# Patient Record
Sex: FEMALE | Race: WHITE | NOT HISPANIC OR LATINO | Employment: OTHER | URBAN - METROPOLITAN AREA
[De-identification: names, ages, dates, MRNs, and addresses within clinical notes are randomized per-mention and may not be internally consistent; named-entity substitution may affect disease eponyms.]

---

## 2021-05-21 ENCOUNTER — CONSULT (OUTPATIENT)
Dept: ONCOLOGY | Facility: CLINIC | Age: 56
End: 2021-05-21

## 2021-05-21 ENCOUNTER — LAB (OUTPATIENT)
Dept: LAB | Facility: HOSPITAL | Age: 56
End: 2021-05-21

## 2021-05-21 VITALS
BODY MASS INDEX: 28.99 KG/M2 | DIASTOLIC BLOOD PRESSURE: 89 MMHG | TEMPERATURE: 98.6 F | SYSTOLIC BLOOD PRESSURE: 133 MMHG | HEIGHT: 66 IN | HEART RATE: 85 BPM | RESPIRATION RATE: 16 BRPM | OXYGEN SATURATION: 98 % | WEIGHT: 180.4 LBS

## 2021-05-21 DIAGNOSIS — D70.8 OTHER NEUTROPENIA (HCC): Primary | ICD-10-CM

## 2021-05-21 DIAGNOSIS — D70.8 OTHER NEUTROPENIA (HCC): ICD-10-CM

## 2021-05-21 PROBLEM — D70.9 NEUTROPENIA (HCC): Status: ACTIVE | Noted: 2021-05-21

## 2021-05-21 LAB
ALBUMIN SERPL-MCNC: 4 G/DL (ref 3.5–5.2)
ALBUMIN/GLOB SERPL: 1 G/DL
ALP SERPL-CCNC: 54 U/L (ref 39–117)
ALT SERPL W P-5'-P-CCNC: 20 U/L (ref 1–33)
ANION GAP SERPL CALCULATED.3IONS-SCNC: 7 MMOL/L (ref 5–15)
AST SERPL-CCNC: 29 U/L (ref 1–32)
BASOPHILS # BLD AUTO: 0.02 10*3/MM3 (ref 0–0.2)
BASOPHILS NFR BLD AUTO: 0.8 % (ref 0–1.5)
BILIRUB SERPL-MCNC: 0.3 MG/DL (ref 0–1.2)
BUN SERPL-MCNC: 14 MG/DL (ref 6–20)
BUN/CREAT SERPL: 16.3 (ref 7–25)
CALCIUM SPEC-SCNC: 8.8 MG/DL (ref 8.6–10.5)
CHLORIDE SERPL-SCNC: 108 MMOL/L (ref 98–107)
CO2 SERPL-SCNC: 25 MMOL/L (ref 22–29)
CREAT SERPL-MCNC: 0.86 MG/DL (ref 0.57–1)
DEPRECATED RDW RBC AUTO: 47.3 FL (ref 37–54)
EOSINOPHIL # BLD AUTO: 0.05 10*3/MM3 (ref 0–0.4)
EOSINOPHIL NFR BLD AUTO: 2.1 % (ref 0.3–6.2)
ERYTHROCYTE [DISTWIDTH] IN BLOOD BY AUTOMATED COUNT: 13.7 % (ref 12.3–15.4)
FERRITIN SERPL-MCNC: 74.39 NG/ML (ref 13–150)
GFR SERPL CREATININE-BSD FRML MDRD: 69 ML/MIN/1.73
GLOBULIN UR ELPH-MCNC: 3.9 GM/DL
GLUCOSE SERPL-MCNC: 66 MG/DL (ref 65–99)
HCT VFR BLD AUTO: 36.4 % (ref 34–46.6)
HGB BLD-MCNC: 11.9 G/DL (ref 12–15.9)
HIV1+2 AB SER QL: NORMAL
IMM GRANULOCYTES # BLD AUTO: 0.01 10*3/MM3 (ref 0–0.05)
IMM GRANULOCYTES NFR BLD AUTO: 0.4 % (ref 0–0.5)
IRON 24H UR-MRATE: 79 MCG/DL (ref 37–145)
IRON SATN MFR SERPL: 26 % (ref 20–50)
LDH SERPL-CCNC: 179 U/L (ref 135–214)
LYMPHOCYTES # BLD AUTO: 0.47 10*3/MM3 (ref 0.7–3.1)
LYMPHOCYTES NFR BLD AUTO: 19.5 % (ref 19.6–45.3)
MCH RBC QN AUTO: 31 PG (ref 26.6–33)
MCHC RBC AUTO-ENTMCNC: 32.7 G/DL (ref 31.5–35.7)
MCV RBC AUTO: 94.8 FL (ref 79–97)
MONOCYTES # BLD AUTO: 0.37 10*3/MM3 (ref 0.1–0.9)
MONOCYTES NFR BLD AUTO: 15.4 % (ref 5–12)
NEUTROPHILS NFR BLD AUTO: 1.49 10*3/MM3 (ref 1.7–7)
NEUTROPHILS NFR BLD AUTO: 61.8 % (ref 42.7–76)
NRBC BLD AUTO-RTO: 0 /100 WBC (ref 0–0.2)
PLATELET # BLD AUTO: 240 10*3/MM3 (ref 140–450)
PMV BLD AUTO: 10.1 FL (ref 6–12)
POTASSIUM SERPL-SCNC: 4 MMOL/L (ref 3.5–5.2)
PROT SERPL-MCNC: 7.9 G/DL (ref 6–8.5)
RBC # BLD AUTO: 3.84 10*6/MM3 (ref 3.77–5.28)
SODIUM SERPL-SCNC: 140 MMOL/L (ref 136–145)
TIBC SERPL-MCNC: 304 MCG/DL (ref 298–536)
TRANSFERRIN SERPL-MCNC: 204 MG/DL (ref 200–360)
WBC # BLD AUTO: 2.41 10*3/MM3 (ref 3.4–10.8)

## 2021-05-21 PROCEDURE — 82728 ASSAY OF FERRITIN: CPT

## 2021-05-21 PROCEDURE — 84466 ASSAY OF TRANSFERRIN: CPT

## 2021-05-21 PROCEDURE — 82607 VITAMIN B-12: CPT

## 2021-05-21 PROCEDURE — 85025 COMPLETE CBC W/AUTO DIFF WBC: CPT

## 2021-05-21 PROCEDURE — G0432 EIA HIV-1/HIV-2 SCREEN: HCPCS

## 2021-05-21 PROCEDURE — 99204 OFFICE O/P NEW MOD 45 MIN: CPT | Performed by: INTERNAL MEDICINE

## 2021-05-21 PROCEDURE — 36415 COLL VENOUS BLD VENIPUNCTURE: CPT

## 2021-05-21 PROCEDURE — 80053 COMPREHEN METABOLIC PANEL: CPT

## 2021-05-21 PROCEDURE — 83540 ASSAY OF IRON: CPT

## 2021-05-21 PROCEDURE — 82746 ASSAY OF FOLIC ACID SERUM: CPT

## 2021-05-21 PROCEDURE — 82525 ASSAY OF COPPER: CPT

## 2021-05-21 PROCEDURE — 83615 LACTATE (LD) (LDH) ENZYME: CPT

## 2021-05-21 PROCEDURE — 85060 BLOOD SMEAR INTERPRETATION: CPT

## 2021-05-21 RX ORDER — PREGABALIN 100 MG/1
CAPSULE ORAL
COMMUNITY
Start: 2021-04-27 | End: 2022-09-13

## 2021-05-21 RX ORDER — NAPROXEN 500 MG/1
500 TABLET ORAL 2 TIMES DAILY WITH MEALS
COMMUNITY
Start: 2021-05-13 | End: 2022-12-05

## 2021-05-21 RX ORDER — LEVOTHYROXINE SODIUM 112 UG/1
112 TABLET ORAL DAILY
COMMUNITY
Start: 2021-05-13

## 2021-05-21 RX ORDER — PILOCARPINE HYDROCHLORIDE 5 MG/1
5 TABLET, FILM COATED ORAL 3 TIMES DAILY PRN
COMMUNITY

## 2021-05-21 RX ORDER — SERTRALINE HYDROCHLORIDE 100 MG/1
100 TABLET, FILM COATED ORAL DAILY
COMMUNITY
Start: 2021-05-13

## 2021-05-21 NOTE — PROGRESS NOTES
New Patient Office Visit      Date: 2021     Patient Name: Shell Nayak  MRN: [id number]  : 1965  Referring Physician: John Greer    Chief Complaint: Establish care for neutropenia    History of Present Illness: Shell Nayak is a pleasant 55 y.o. female past medical history of hypothyroidism, fibromyalgia, Sojourn syndrome, rheumatoid arthritis who presents today for evaluation of neutropenia. The patient is accompanied by their aunt who contributes to the history of their care.  The patient has been followed by rheumatology for her multiple arthritic conditions.  She has been on several medications including most recently rituximab.  Per chart review, she has had multiple CBCs checked over the past year which have been notable for a mild leukopenia of around 2.5-3.  ANC has been stable at 1500.  She denies any recurrent fevers or infections.  Denies any unexplained weight loss.  Does note night sweats that happen 2-3 times per month.  Also notes significant fatigue which has been worsening over the past several months.  Denies any easy bleeding or bruising episodes.  Denies any family history of leukemia or lymphoma.  She is anxious about her visit today but otherwise compliant with other home medications with no other major concerns.    Oncology History:    Oncology/Hematology History    No history exists.       Subjective      Review of Systems:     Constitutional: Positive for fatigue.  Negative for fevers, chills, or weight loss  Eyes: Negative for blurred vision or discharge         Ear/Nose/Throat: Negative for difficulty swallowing, sore throat, LAD                                                       Respiratory: Negative for cough, SOA, wheezing                                                                                        Cardiovascular: Negative for chest pain or palpitations                                                                  Gastrointestinal: Negative for  nausea, vomiting or diarrhea                                                                     Genitourinary: Negative for dysuria or hematuria                                                                                           Musculoskeletal: Positive for multiple joint pains.  Negative for muscle aches                                                                        Neurologic: Negative for any weakness, headaches, dizziness                                                                         Hematologic: Negative for any easy bleeding or bruising                                                                                   Psychiatric: Negative for anxiety or depression                             Past Medical History:   Past Medical History:   Diagnosis Date   • Arthritis    • COPD (chronic obstructive pulmonary disease) (CMS/HCC)    • Disease of thyroid gland    • Rheumatoid arthritis (CMS/HCC)    • Sjogren's disease (CMS/HCC)        Past Surgical History:   Past Surgical History:   Procedure Laterality Date   • APPENDECTOMY     •  SECTION     • CHOLECYSTECTOMY     • LUNG BIOPSY     • THYROID BIOPSY         Family History:   Family History   Problem Relation Age of Onset   • Colon cancer Father    • Prostate cancer Father    • Breast cancer Maternal Grandmother    • Thyroid cancer Maternal Grandmother        Social History:   Social History     Socioeconomic History   • Marital status:      Spouse name: Not on file   • Number of children: Not on file   • Years of education: Not on file   • Highest education level: Not on file   Tobacco Use   • Smoking status: Never Smoker   • Smokeless tobacco: Never Used   Substance and Sexual Activity   • Alcohol use: Yes     Comment: 0-1 drink weekly   • Drug use: Never       Medications:     Current Outpatient Medications:   •  levothyroxine (SYNTHROID, LEVOTHROID) 112 MCG tablet, , Disp: , Rfl:   •  naproxen (NAPROSYN) 500 MG tablet, ,  "Disp: , Rfl:   •  pilocarpine (SALAGEN) 5 MG tablet, Take 5 mg by mouth 3 (Three) Times a Day., Disp: , Rfl:   •  pregabalin (LYRICA) 100 MG capsule, , Disp: , Rfl:   •  sertraline (ZOLOFT) 100 MG tablet, , Disp: , Rfl:     Allergies:   Allergies   Allergen Reactions   • Penicillins Rash     Hasn't had since childhood       Objective     Physical Exam:  Vital Signs:   Vitals:    05/21/21 1325   BP: 133/89   Pulse: 85   Resp: 16   Temp: 98.6 °F (37 °C)   TempSrc: Temporal   SpO2: 98%   Weight: 81.8 kg (180 lb 6.4 oz)   Height: 167.6 cm (66\")   PainSc:   4   PainLoc: Foot  Comment: Feet, Back, Shoulders     Pain Score    05/21/21 1325   PainSc:   4   PainLoc: Foot  Comment: Feet, Back, Shoulders     ECOG Performance Status: 1 - Symptomatic but completely ambulatory    Constitutional: NAD, ECOG 1  Eyes: PERRLA, scleral anicteric  ENT: No LAD, no thyromegaly  Respiratory: CTAB, no wheezing, rales, rhonchi  Cardiovascular: RRR, no murmurs, pulses 2+ bilaterally  Abdomen: soft, NT/ND, no HSM  Musculoskeletal: strength 5/5 bilaterally, no c/c/e  Neurologic: A&O x 3, CN II-XII intact grossly  Psych: mood and affect congruent, no SI or HI    Results Review:   No visits with results within 2 Week(s) from this visit.   Latest known visit with results is:   No results found for any previous visit.       No results found.    Assessment / Plan      Assessment/Plan:   1. Other neutropenia (CMS/HCC) (Primary)  -Unclear etiology at this time.  Likely secondary to patient's underlying rheumatologic conditions as well as medication  -Per chart review WBC between 2.5-3 with ANC at 1500  -Patient currently asymptomatic with no significant constitutional symptoms  -We will check labs as below to rule out other secondary causes  -     CBC & Differential; Future  -     Comprehensive Metabolic Panel; Future  -     Ferritin; Future  -     Iron Profile; Future  -     Lactate Dehydrogenase; Future  -     Vitamin B12; Future  -     Folate; " Future  -     HIV-1 / O / 2 Ag / Antibody 4th Generation; Future  -     Peripheral Blood Smear; Future  -     Copper, Serum; Future  -     Flow Cytometry, Blood; Future    2.  Sojourn syndrome/rheumatoid arthritis/fibromyalgia  -Followed by Dr. Greer with rheumatology      Follow Up:   Follow-up in 3-4 weeks     Lyle Metcalf MD  Hematology and Oncology     Please note that portions of this note may have been completed with a voice recognition program. Efforts were made to edit the dictations, but occasionally words are mistranscribed.

## 2021-05-22 LAB
FOLATE SERPL-MCNC: 15.9 NG/ML (ref 4.78–24.2)
VIT B12 BLD-MCNC: 356 PG/ML (ref 211–946)

## 2021-05-24 LAB
CYTOLOGIST CVX/VAG CYTO: NORMAL
PATH INTERP BLD-IMP: NORMAL

## 2021-05-25 LAB — REF LAB TEST METHOD: NORMAL

## 2021-05-26 LAB — COPPER SERPL-MCNC: 90 UG/DL (ref 80–158)

## 2021-06-18 ENCOUNTER — OFFICE VISIT (OUTPATIENT)
Dept: ONCOLOGY | Facility: CLINIC | Age: 56
End: 2021-06-18

## 2021-06-18 VITALS
BODY MASS INDEX: 28.61 KG/M2 | HEIGHT: 66 IN | WEIGHT: 178 LBS | TEMPERATURE: 98.4 F | RESPIRATION RATE: 16 BRPM | OXYGEN SATURATION: 98 % | DIASTOLIC BLOOD PRESSURE: 91 MMHG | HEART RATE: 69 BPM | SYSTOLIC BLOOD PRESSURE: 152 MMHG

## 2021-06-18 DIAGNOSIS — D70.8 OTHER NEUTROPENIA (HCC): Primary | ICD-10-CM

## 2021-06-18 PROCEDURE — 99214 OFFICE O/P EST MOD 30 MIN: CPT | Performed by: INTERNAL MEDICINE

## 2021-06-18 RX ORDER — BUDESONIDE AND FORMOTEROL FUMARATE DIHYDRATE 160; 4.5 UG/1; UG/1
2 AEROSOL RESPIRATORY (INHALATION)
COMMUNITY
Start: 2021-05-27

## 2021-06-18 RX ORDER — ALBUTEROL SULFATE 90 UG/1
2 AEROSOL, METERED RESPIRATORY (INHALATION) EVERY 4 HOURS PRN
COMMUNITY
Start: 2021-06-10

## 2021-06-18 RX ORDER — ALBUTEROL SULFATE 2.5 MG/3ML
2.5 SOLUTION RESPIRATORY (INHALATION) EVERY 4 HOURS PRN
COMMUNITY
Start: 2021-06-10

## 2021-06-18 NOTE — PROGRESS NOTES
Follow Up Office Visit      Date: 2021     Patient Name: Shell Nayak  MRN: [id number]  : 1965  Referring Physician: John Greer     Chief Complaint:  Follow-up for neutropenia     History of Present Illness: Shell Nayak is a pleasant 55 y.o. female past medical history of hypothyroidism, fibromyalgia, Sojourn syndrome, rheumatoid arthritis who presents today for evaluation of neutropenia. The patient is accompanied by their aunt who contributes to the history of their care.  The patient has been followed by rheumatology for her multiple arthritic conditions.  She has been on several medications including most recently rituximab.  Per chart review, she has had multiple CBCs checked over the past year which have been notable for a mild leukopenia of around 2.5-3.  ANC has been stable at 1500.  She denies any recurrent fevers or infections.  Denies any unexplained weight loss.  Does note night sweats that happen 2-3 times per month.  Also notes significant fatigue which has been worsening over the past several months.  Denies any easy bleeding or bruising episodes.  Denies any family history of leukemia or lymphoma.  She is anxious about her visit today but otherwise compliant with other home medications with no other major concerns.    Interval History:  Presents clinic for follow-up.  Continues to follow with rheumatology for rituximab infusions secondary to arthritis.  She continues to have some fatigue but denies any unexplained fevers, chills, weight loss, night sweats    Oncology History:    Oncology/Hematology History    No history exists.       Subjective      Review of Systems:   Constitutional: Negative for fevers, chills, or weight loss  Eyes: Negative for blurred vision or discharge         Ear/Nose/Throat: Negative for difficulty swallowing, sore throat, LAD                                                       Respiratory: Negative for cough, SOA, wheezing                                                                                         Cardiovascular: Negative for chest pain or palpitations                                                                  Gastrointestinal: Negative for nausea, vomiting or diarrhea                                                                     Genitourinary: Negative for dysuria or hematuria                                                                                           Musculoskeletal: Negative for any joint pains or muscle aches                                                                        Neurologic: Negative for any weakness, headaches, dizziness                                                                         Hematologic: Negative for any easy bleeding or bruising                                                                                   Psychiatric: Negative for anxiety or depression                          Past Medical History/Past Surgical History/ Family History/ Social History: Reviewed by me and unchanged from my previous documentation done on May 2021.     Medications:     Current Outpatient Medications:   •  albuterol (PROVENTIL) (2.5 MG/3ML) 0.083% nebulizer solution, , Disp: , Rfl:   •  albuterol sulfate  (90 Base) MCG/ACT inhaler, , Disp: , Rfl:   •  levothyroxine (SYNTHROID, LEVOTHROID) 112 MCG tablet, , Disp: , Rfl:   •  naproxen (NAPROSYN) 500 MG tablet, , Disp: , Rfl:   •  pilocarpine (SALAGEN) 5 MG tablet, Take 5 mg by mouth 3 (Three) Times a Day., Disp: , Rfl:   •  pregabalin (LYRICA) 100 MG capsule, , Disp: , Rfl:   •  sertraline (ZOLOFT) 100 MG tablet, , Disp: , Rfl:   •  Symbicort 160-4.5 MCG/ACT inhaler, , Disp: , Rfl:     Allergies:   Allergies   Allergen Reactions   • Penicillins Rash     Hasn't had since childhood       Objective     Physical Exam:  Vital Signs:   Vitals:    06/18/21 1345   BP: 152/91  Comment: RUE   Pulse: 69   Resp: 16   Temp: 98.4 °F (36.9 °C)   TempSrc:  "Infrared   SpO2: 98%  Comment: RA   Weight: 80.7 kg (178 lb)   Height: 167.6 cm (66\")   PainSc: 7  Comment: Joint Pain     Pain Score    06/18/21 1345   PainSc: 7  Comment: Joint Pain     ECOG Performance Status: 1 - Symptomatic but completely ambulatory    Constitutional: NAD, ECOG 1  Eyes: PERRLA, scleral anicteric  ENT: No LAD, no thyromegaly  Respiratory: CTAB, no wheezing, rales, rhonchi  Cardiovascular: RRR, no murmurs, pulses 2+ bilaterally  Abdomen: soft, NT/ND, no HSM  Musculoskeletal: strength 5/5 bilaterally, no c/c/e  Neurologic: A&O x 3, CN II-XII intact grossly    Results Review:   No visits with results within 2 Week(s) from this visit.   Latest known visit with results is:   Lab on 05/21/2021   Component Date Value Ref Range Status   • Glucose 05/21/2021 66  65 - 99 mg/dL Final   • BUN 05/21/2021 14  6 - 20 mg/dL Final   • Creatinine 05/21/2021 0.86  0.57 - 1.00 mg/dL Final   • Sodium 05/21/2021 140  136 - 145 mmol/L Final   • Potassium 05/21/2021 4.0  3.5 - 5.2 mmol/L Final    Slight hemolysis detected by analyzer. Results may be affected.   • Chloride 05/21/2021 108* 98 - 107 mmol/L Final   • CO2 05/21/2021 25.0  22.0 - 29.0 mmol/L Final   • Calcium 05/21/2021 8.8  8.6 - 10.5 mg/dL Final   • Total Protein 05/21/2021 7.9  6.0 - 8.5 g/dL Final   • Albumin 05/21/2021 4.00  3.50 - 5.20 g/dL Final   • ALT (SGPT) 05/21/2021 20  1 - 33 U/L Final   • AST (SGOT) 05/21/2021 29  1 - 32 U/L Final   • Alkaline Phosphatase 05/21/2021 54  39 - 117 U/L Final   • Total Bilirubin 05/21/2021 0.3  0.0 - 1.2 mg/dL Final   • eGFR Non African Amer 05/21/2021 69  >60 mL/min/1.73 Final   • Globulin 05/21/2021 3.9  gm/dL Final   • A/G Ratio 05/21/2021 1.0  g/dL Final   • BUN/Creatinine Ratio 05/21/2021 16.3  7.0 - 25.0 Final   • Anion Gap 05/21/2021 7.0  5.0 - 15.0 mmol/L Final   • Ferritin 05/21/2021 74.39  13.00 - 150.00 ng/mL Final   • Iron 05/21/2021 79  37 - 145 mcg/dL Final   • Iron Saturation 05/21/2021 26  20 - 50 % " Final   • Transferrin 05/21/2021 204  200 - 360 mg/dL Final   • TIBC 05/21/2021 304  298 - 536 mcg/dL Final   • LDH 05/21/2021 179  135 - 214 U/L Final   • Vitamin B-12 05/21/2021 356  211 - 946 pg/mL Final   • Folate 05/21/2021 15.90  4.78 - 24.20 ng/mL Final   • HIV-1/ HIV-2 05/21/2021 Non-Reactive  Non-Reactive Final   • Performed by: 05/21/2021 Spencer Lerma MD   Final   • Pathologist Interpretation 05/21/2021 Leukopenia with mild absolute neutropenia and lymphopenia.  Normochromic normocytic red blood cells without anemia.  Platelets with normal appearance present in adequate numbers.   Final   • Copper 05/21/2021 90  80 - 158 ug/dL Final                                        Detection Limit = 5                **Please note reference interval change**   • Reference Lab Report 05/21/2021    Final    See scanned report     • WBC 05/21/2021 2.41* 3.40 - 10.80 10*3/mm3 Final   • RBC 05/21/2021 3.84  3.77 - 5.28 10*6/mm3 Final   • Hemoglobin 05/21/2021 11.9* 12.0 - 15.9 g/dL Final   • Hematocrit 05/21/2021 36.4  34.0 - 46.6 % Final   • MCV 05/21/2021 94.8  79.0 - 97.0 fL Final   • MCH 05/21/2021 31.0  26.6 - 33.0 pg Final   • MCHC 05/21/2021 32.7  31.5 - 35.7 g/dL Final   • RDW 05/21/2021 13.7  12.3 - 15.4 % Final   • RDW-SD 05/21/2021 47.3  37.0 - 54.0 fl Final   • MPV 05/21/2021 10.1  6.0 - 12.0 fL Final   • Platelets 05/21/2021 240  140 - 450 10*3/mm3 Final   • Neutrophil % 05/21/2021 61.8  42.7 - 76.0 % Final   • Lymphocyte % 05/21/2021 19.5* 19.6 - 45.3 % Final   • Monocyte % 05/21/2021 15.4* 5.0 - 12.0 % Final   • Eosinophil % 05/21/2021 2.1  0.3 - 6.2 % Final   • Basophil % 05/21/2021 0.8  0.0 - 1.5 % Final   • Immature Grans % 05/21/2021 0.4  0.0 - 0.5 % Final   • Neutrophils, Absolute 05/21/2021 1.49* 1.70 - 7.00 10*3/mm3 Final   • Lymphocytes, Absolute 05/21/2021 0.47* 0.70 - 3.10 10*3/mm3 Final   • Monocytes, Absolute 05/21/2021 0.37  0.10 - 0.90 10*3/mm3 Final   • Eosinophils, Absolute 05/21/2021 0.05   0.00 - 0.40 10*3/mm3 Final   • Basophils, Absolute 05/21/2021 0.02  0.00 - 0.20 10*3/mm3 Final   • Immature Grans, Absolute 05/21/2021 0.01  0.00 - 0.05 10*3/mm3 Final   • nRBC 05/21/2021 0.0  0.0 - 0.2 /100 WBC Final       No results found.    Assessment / Plan      Assessment/Plan:   1. Other neutropenia (CMS/HCC) (Primary)  -Unclear etiology at this time.  Likely secondary to patient's underlying rheumatologic conditions as well as medication  -Per chart review WBC between 2.5-3 with ANC at 1500  -Patient currently asymptomatic with no significant constitutional symptoms except for fatigue  -Repeat CBC in May 2021 with a white count of 2.4 and an ANC of 1.49  -Iron studies, LDH, vitamin B12, folate, copper within normal limits  -HIV negative  -Peripheral smear with no immature cells or blast  -Peripheral flow showing a 20% population of monocytes which are nonspecific and could be reactive given her chronic history of arthritis  -We will plan to follow-up with repeat CBC in 6 months  -Counseled patient on constitutional symptoms and to notify the clinic should she experience them more frequently     2.  Sojourn syndrome/rheumatoid arthritis/fibromyalgia  -Followed by Dr. Greer with rheumatology  -Currently getting rituximab infusions    Follow Up:   Follow-up in 6 months with repeat CBC     Lyle Metcalf MD  Hematology and Oncology     Please note that portions of this note may have been completed with a voice recognition program. Efforts were made to edit the dictations, but occasionally words are mistranscribed.

## 2021-12-10 ENCOUNTER — OFFICE VISIT (OUTPATIENT)
Dept: ONCOLOGY | Facility: CLINIC | Age: 56
End: 2021-12-10

## 2021-12-10 ENCOUNTER — LAB (OUTPATIENT)
Dept: LAB | Facility: HOSPITAL | Age: 56
End: 2021-12-10

## 2021-12-10 VITALS
OXYGEN SATURATION: 98 % | RESPIRATION RATE: 16 BRPM | HEART RATE: 97 BPM | HEIGHT: 66 IN | TEMPERATURE: 97.1 F | BODY MASS INDEX: 29.04 KG/M2 | DIASTOLIC BLOOD PRESSURE: 86 MMHG | WEIGHT: 180.7 LBS | SYSTOLIC BLOOD PRESSURE: 148 MMHG

## 2021-12-10 DIAGNOSIS — D70.8 OTHER NEUTROPENIA (HCC): Primary | ICD-10-CM

## 2021-12-10 DIAGNOSIS — D70.8 OTHER NEUTROPENIA (HCC): ICD-10-CM

## 2021-12-10 LAB
ERYTHROCYTE [DISTWIDTH] IN BLOOD BY AUTOMATED COUNT: 15 % (ref 12.3–15.4)
FERRITIN SERPL-MCNC: 178.3 NG/ML (ref 13–150)
HCT VFR BLD AUTO: 30.4 % (ref 34–46.6)
HGB BLD-MCNC: 9.8 G/DL (ref 12–15.9)
IRON 24H UR-MRATE: 43 MCG/DL (ref 37–145)
IRON SATN MFR SERPL: 22 % (ref 20–50)
LYMPHOCYTES # BLD AUTO: 0.4 10*3/MM3 (ref 0.7–3.1)
LYMPHOCYTES NFR BLD AUTO: 10.3 % (ref 19.6–45.3)
MCH RBC QN AUTO: 29.7 PG (ref 26.6–33)
MCHC RBC AUTO-ENTMCNC: 32.3 G/DL (ref 31.5–35.7)
MCV RBC AUTO: 91.9 FL (ref 79–97)
MONOCYTES # BLD AUTO: 0.4 10*3/MM3 (ref 0.1–0.9)
MONOCYTES NFR BLD AUTO: 9.8 % (ref 5–12)
NEUTROPHILS NFR BLD AUTO: 3.1 10*3/MM3 (ref 1.7–7)
NEUTROPHILS NFR BLD AUTO: 79.9 % (ref 42.7–76)
PLATELET # BLD AUTO: 426 10*3/MM3 (ref 140–450)
PMV BLD AUTO: 6.1 FL (ref 6–12)
RBC # BLD AUTO: 3.31 10*6/MM3 (ref 3.77–5.28)
TIBC SERPL-MCNC: 194 MCG/DL (ref 298–536)
TRANSFERRIN SERPL-MCNC: 130 MG/DL (ref 200–360)
WBC NRBC COR # BLD: 3.9 10*3/MM3 (ref 3.4–10.8)

## 2021-12-10 PROCEDURE — 36415 COLL VENOUS BLD VENIPUNCTURE: CPT

## 2021-12-10 PROCEDURE — 83540 ASSAY OF IRON: CPT

## 2021-12-10 PROCEDURE — 85025 COMPLETE CBC W/AUTO DIFF WBC: CPT

## 2021-12-10 PROCEDURE — 84466 ASSAY OF TRANSFERRIN: CPT

## 2021-12-10 PROCEDURE — 82728 ASSAY OF FERRITIN: CPT

## 2021-12-10 PROCEDURE — 99214 OFFICE O/P EST MOD 30 MIN: CPT | Performed by: INTERNAL MEDICINE

## 2021-12-10 NOTE — PROGRESS NOTES
Follow Up Office Visit      Date: 12/10/2021     Patient Name: Shell Nayak  MRN: [id number]  : 1965  Referring Physician: John Greer     Chief Complaint:  Follow-up for neutropenia     History of Present Illness: Shell Nayak is a pleasant 55 y.o. female past medical history of hypothyroidism, fibromyalgia, Sojourn syndrome, rheumatoid arthritis who presents today for evaluation of neutropenia. The patient is accompanied by their aunt who contributes to the history of their care.  The patient has been followed by rheumatology for her multiple arthritic conditions.  She has been on several medications including most recently rituximab.  Per chart review, she has had multiple CBCs checked over the past year which have been notable for a mild leukopenia of around 2.5-3.  ANC has been stable at 1500.  She denies any recurrent fevers or infections.  Denies any unexplained weight loss.  Does note night sweats that happen 2-3 times per month.  Also notes significant fatigue which has been worsening over the past several months.  Denies any easy bleeding or bruising episodes.  Denies any family history of leukemia or lymphoma.  She is anxious about her visit today but otherwise compliant with other home medications with no other major concerns.     Interval History:  Presents clinic for follow-up.  Continues to follow with rheumatology for rituximab infusions secondary to arthritis.  States her last infusion was in 2021.  In 2020 when she was diagnosed with Covid-19.  She has had significant breathing issues since the diagnosis.  Was hospitalized for several days and has had multiple pneumonias since October.  States she has been on 3 rounds of antibiotic therapy.  Currently off antibiotics but is taking prednisone as needed.  Notes that her father  of COVID-19 in 2021    Oncology History:    Oncology/Hematology History    No history exists.       Subjective      Review of  Systems:   Constitutional: Negative for fevers, chills, or weight loss  Eyes: Negative for blurred vision or discharge         Ear/Nose/Throat: Negative for difficulty swallowing, sore throat, LAD                                                       Respiratory: Negative for cough, SOA, wheezing                                                                                        Cardiovascular: Negative for chest pain or palpitations                                                                  Gastrointestinal: Negative for nausea, vomiting or diarrhea                                                                     Genitourinary: Negative for dysuria or hematuria                                                                                           Musculoskeletal: Negative for any joint pains or muscle aches                                                                        Neurologic: Negative for any weakness, headaches, dizziness                                                                         Hematologic: Negative for any easy bleeding or bruising                                                                                   Psychiatric: Negative for anxiety or depression                          Past Medical History/Past Surgical History/ Family History/ Social History: Reviewed by me and unchanged from my previous documentation done on June 2021.     Medications:     Current Outpatient Medications:   •  albuterol (PROVENTIL) (2.5 MG/3ML) 0.083% nebulizer solution, , Disp: , Rfl:   •  albuterol sulfate  (90 Base) MCG/ACT inhaler, , Disp: , Rfl:   •  levothyroxine (SYNTHROID, LEVOTHROID) 112 MCG tablet, , Disp: , Rfl:   •  naproxen (NAPROSYN) 500 MG tablet, , Disp: , Rfl:   •  pilocarpine (SALAGEN) 5 MG tablet, Take 5 mg by mouth 3 (Three) Times a Day., Disp: , Rfl:   •  pregabalin (LYRICA) 100 MG capsule, , Disp: , Rfl:   •  sertraline (ZOLOFT) 100 MG tablet, , Disp: , Rfl:  "  •  Symbicort 160-4.5 MCG/ACT inhaler, , Disp: , Rfl:     Allergies:   Allergies   Allergen Reactions   • Penicillins Rash     Hasn't had since childhood       Objective     Physical Exam:  Vital Signs:   Vitals:    12/10/21 1406   BP: 148/86   Pulse: 97   Resp: 16   Temp: 97.1 °F (36.2 °C)   TempSrc: Temporal   SpO2: 98%   Weight: 82 kg (180 lb 11.2 oz)   Height: 167.6 cm (65.98\")   PainSc:   4     Pain Score    12/10/21 1406   PainSc:   4     ECOG Performance Status: 1 - Symptomatic but completely ambulatory    Constitutional: NAD, ECOG 1  Eyes: PERRLA, scleral anicteric  ENT: No LAD, no thyromegaly  Respiratory: CTAB, no wheezing, rales, rhonchi  Cardiovascular: RRR, no murmurs, pulses 2+ bilaterally  Abdomen: soft, NT/ND, no HSM  Musculoskeletal: strength 5/5 bilaterally, no c/c/e  Neurologic: A&O x 3, CN II-XII intact grossly    Results Review:   No visits with results within 2 Week(s) from this visit.   Latest known visit with results is:   Lab on 05/21/2021   Component Date Value Ref Range Status   • Glucose 05/21/2021 66  65 - 99 mg/dL Final   • BUN 05/21/2021 14  6 - 20 mg/dL Final   • Creatinine 05/21/2021 0.86  0.57 - 1.00 mg/dL Final   • Sodium 05/21/2021 140  136 - 145 mmol/L Final   • Potassium 05/21/2021 4.0  3.5 - 5.2 mmol/L Final    Slight hemolysis detected by analyzer. Results may be affected.   • Chloride 05/21/2021 108* 98 - 107 mmol/L Final   • CO2 05/21/2021 25.0  22.0 - 29.0 mmol/L Final   • Calcium 05/21/2021 8.8  8.6 - 10.5 mg/dL Final   • Total Protein 05/21/2021 7.9  6.0 - 8.5 g/dL Final   • Albumin 05/21/2021 4.00  3.50 - 5.20 g/dL Final   • ALT (SGPT) 05/21/2021 20  1 - 33 U/L Final   • AST (SGOT) 05/21/2021 29  1 - 32 U/L Final   • Alkaline Phosphatase 05/21/2021 54  39 - 117 U/L Final   • Total Bilirubin 05/21/2021 0.3  0.0 - 1.2 mg/dL Final   • eGFR Non African Amer 05/21/2021 69  >60 mL/min/1.73 Final   • Globulin 05/21/2021 3.9  gm/dL Final   • A/G Ratio 05/21/2021 1.0  g/dL Final "   • BUN/Creatinine Ratio 05/21/2021 16.3  7.0 - 25.0 Final   • Anion Gap 05/21/2021 7.0  5.0 - 15.0 mmol/L Final   • Ferritin 05/21/2021 74.39  13.00 - 150.00 ng/mL Final   • Iron 05/21/2021 79  37 - 145 mcg/dL Final   • Iron Saturation 05/21/2021 26  20 - 50 % Final   • Transferrin 05/21/2021 204  200 - 360 mg/dL Final   • TIBC 05/21/2021 304  298 - 536 mcg/dL Final   • LDH 05/21/2021 179  135 - 214 U/L Final   • Vitamin B-12 05/21/2021 356  211 - 946 pg/mL Final   • Folate 05/21/2021 15.90  4.78 - 24.20 ng/mL Final   • HIV-1/ HIV-2 05/21/2021 Non-Reactive  Non-Reactive Final   • Performed by: 05/21/2021 Spencer Lerma MD   Final   • Pathologist Interpretation 05/21/2021 Leukopenia with mild absolute neutropenia and lymphopenia.  Normochromic normocytic red blood cells without anemia.  Platelets with normal appearance present in adequate numbers.   Final   • Copper 05/21/2021 90  80 - 158 ug/dL Final                                        Detection Limit = 5                **Please note reference interval change**   • Reference Lab Report 05/21/2021    Final    See scanned report     • WBC 05/21/2021 2.41* 3.40 - 10.80 10*3/mm3 Final   • RBC 05/21/2021 3.84  3.77 - 5.28 10*6/mm3 Final   • Hemoglobin 05/21/2021 11.9* 12.0 - 15.9 g/dL Final   • Hematocrit 05/21/2021 36.4  34.0 - 46.6 % Final   • MCV 05/21/2021 94.8  79.0 - 97.0 fL Final   • MCH 05/21/2021 31.0  26.6 - 33.0 pg Final   • MCHC 05/21/2021 32.7  31.5 - 35.7 g/dL Final   • RDW 05/21/2021 13.7  12.3 - 15.4 % Final   • RDW-SD 05/21/2021 47.3  37.0 - 54.0 fl Final   • MPV 05/21/2021 10.1  6.0 - 12.0 fL Final   • Platelets 05/21/2021 240  140 - 450 10*3/mm3 Final   • Neutrophil % 05/21/2021 61.8  42.7 - 76.0 % Final   • Lymphocyte % 05/21/2021 19.5* 19.6 - 45.3 % Final   • Monocyte % 05/21/2021 15.4* 5.0 - 12.0 % Final   • Eosinophil % 05/21/2021 2.1  0.3 - 6.2 % Final   • Basophil % 05/21/2021 0.8  0.0 - 1.5 % Final   • Immature Grans % 05/21/2021 0.4  0.0 -  0.5 % Final   • Neutrophils, Absolute 05/21/2021 1.49* 1.70 - 7.00 10*3/mm3 Final   • Lymphocytes, Absolute 05/21/2021 0.47* 0.70 - 3.10 10*3/mm3 Final   • Monocytes, Absolute 05/21/2021 0.37  0.10 - 0.90 10*3/mm3 Final   • Eosinophils, Absolute 05/21/2021 0.05  0.00 - 0.40 10*3/mm3 Final   • Basophils, Absolute 05/21/2021 0.02  0.00 - 0.20 10*3/mm3 Final   • Immature Grans, Absolute 05/21/2021 0.01  0.00 - 0.05 10*3/mm3 Final   • nRBC 05/21/2021 0.0  0.0 - 0.2 /100 WBC Final       No results found.    Assessment / Plan      Assessment/Plan:   1. Other neutropenia (CMS/HCC) (Primary)  -Unclear etiology at this time.  Likely secondary to patient's underlying rheumatologic conditions as well as medication  -Per chart review WBC between 2.5-3 with ANC at 1500  -Patient currently asymptomatic with no significant constitutional symptoms except for fatigue  -Repeat CBC in May 2021 with a white count of 2.4 and an ANC of 1.49  -Iron studies, LDH, vitamin B12, folate, copper within normal limits  -HIV negative  -Peripheral smear with no immature cells or blast  -Peripheral flow showing a 20% population of monocytes which are nonspecific and could be reactive given her chronic history of arthritis  -Repeat CBC in December 2021 showing a WBC of 3.9 and an ANC of 3.1  -No further hematologic work-up required     2.  Sojourn syndrome/rheumatoid arthritis/fibromyalgia  -Followed by Dr. Greer with rheumatology  -Currently getting rituximab infusions    3.  Dyspnea  -Patient complained of some mild dyspnea in clinic today although her O2 sats are 98%  -Has been diagnosed with pneumonia multiple times since her Covid-19 diagnosis in October 2021  -Currently off antibiotics but has been taking steroids as needed  -States that she was recently seen in the ED and had a negative CT PE but showed continued pneumonia  -Advised patient to continue following up with her pulmonologist    4.  Anemia  -Unclear etiology  -Hemoglobin 9.8  today which is down from 11.9 in May 2021  -We will check iron studies today given her slight dyspnea    Follow Up:   Follow-up pending results of CBC today     Lyle Metcalf MD  Hematology and Oncology     Please note that portions of this note may have been completed with a voice recognition program. Efforts were made to edit the dictations, but occasionally words are mistranscribed.

## 2021-12-13 ENCOUNTER — TELEPHONE (OUTPATIENT)
Dept: ONCOLOGY | Facility: CLINIC | Age: 56
End: 2021-12-13

## 2021-12-13 NOTE — TELEPHONE ENCOUNTER
Caller: Shell Nayak    Relationship: Self    Best call back number: [phone]    What test was performed: LABS    When was the test performed: 12/10/21    Additional notes: PT WOULD LIKE SOMEONE TO CALL HER WITH HER LAB RESULTS SHE HAD PERFORMED ON FRI 12/10.

## 2021-12-13 NOTE — TELEPHONE ENCOUNTER
Discussed labs with Dr. Metcalf, advised patient iron studies are stable. Patient states she is experiencing chest discomfort, coughing, low grade fever, along with the persistent shortness of air. Advised patient to follow up with PCP and/or urgent care to further assess for possible: Pneumonia, flu, COVID, etc. Patient verbalized understanding.

## 2022-09-06 ENCOUNTER — PREP FOR SURGERY (OUTPATIENT)
Dept: OTHER | Facility: HOSPITAL | Age: 57
End: 2022-09-06

## 2022-09-06 ENCOUNTER — OFFICE VISIT (OUTPATIENT)
Dept: PULMONOLOGY | Facility: CLINIC | Age: 57
End: 2022-09-06

## 2022-09-06 VITALS
BODY MASS INDEX: 27.42 KG/M2 | HEART RATE: 81 BPM | HEIGHT: 66 IN | TEMPERATURE: 97.7 F | OXYGEN SATURATION: 98 % | WEIGHT: 170.6 LBS | DIASTOLIC BLOOD PRESSURE: 86 MMHG | SYSTOLIC BLOOD PRESSURE: 118 MMHG

## 2022-09-06 DIAGNOSIS — M06.9 RHEUMATOID ARTHRITIS INVOLVING MULTIPLE SITES, UNSPECIFIED WHETHER RHEUMATOID FACTOR PRESENT: ICD-10-CM

## 2022-09-06 DIAGNOSIS — J84.9 ILD (INTERSTITIAL LUNG DISEASE): ICD-10-CM

## 2022-09-06 DIAGNOSIS — R06.02 SOB (SHORTNESS OF BREATH) ON EXERTION: ICD-10-CM

## 2022-09-06 DIAGNOSIS — R06.02 SOB (SHORTNESS OF BREATH) ON EXERTION: Primary | ICD-10-CM

## 2022-09-06 DIAGNOSIS — M35.02 SJOGREN'S SYNDROME WITH LUNG INVOLVEMENT: ICD-10-CM

## 2022-09-06 DIAGNOSIS — M32.13 SYSTEMIC LUPUS ERYTHEMATOSUS WITH LUNG INVOLVEMENT, UNSPECIFIED SLE TYPE: ICD-10-CM

## 2022-09-06 DIAGNOSIS — J96.11 CHRONIC RESPIRATORY FAILURE WITH HYPOXIA: ICD-10-CM

## 2022-09-06 DIAGNOSIS — J84.9 ILD (INTERSTITIAL LUNG DISEASE): Primary | ICD-10-CM

## 2022-09-06 DIAGNOSIS — Z00.6 EXAMINATION FOR NORMAL COMPARISON OR CONTROL IN CLINICAL RESEARCH: Primary | ICD-10-CM

## 2022-09-06 PROCEDURE — 94375 RESPIRATORY FLOW VOLUME LOOP: CPT | Performed by: INTERNAL MEDICINE

## 2022-09-06 PROCEDURE — 94729 DIFFUSING CAPACITY: CPT | Performed by: INTERNAL MEDICINE

## 2022-09-06 PROCEDURE — 99204 OFFICE O/P NEW MOD 45 MIN: CPT | Performed by: INTERNAL MEDICINE

## 2022-09-06 PROCEDURE — 94726 PLETHYSMOGRAPHY LUNG VOLUMES: CPT | Performed by: INTERNAL MEDICINE

## 2022-09-06 RX ORDER — SODIUM CHLORIDE 9 MG/ML
125 INJECTION, SOLUTION INTRAVENOUS CONTINUOUS
Status: CANCELLED | OUTPATIENT
Start: 2022-09-06

## 2022-09-06 RX ORDER — SODIUM CHLORIDE 0.9 % (FLUSH) 0.9 %
3 SYRINGE (ML) INJECTION EVERY 12 HOURS SCHEDULED
Status: CANCELLED | OUTPATIENT
Start: 2022-09-06

## 2022-09-06 RX ORDER — SODIUM CHLORIDE 0.9 % (FLUSH) 0.9 %
10 SYRINGE (ML) INJECTION AS NEEDED
Status: CANCELLED | OUTPATIENT
Start: 2022-09-06

## 2022-09-06 RX ORDER — ZINC GLUCONATE 50 MG
1 TABLET ORAL DAILY
COMMUNITY

## 2022-09-06 RX ORDER — PREGABALIN 150 MG/1
1 CAPSULE ORAL EVERY 12 HOURS
COMMUNITY
Start: 2022-05-27

## 2022-09-06 RX ORDER — LIDOCAINE HYDROCHLORIDE 40 MG/ML
4 INJECTION, SOLUTION RETROBULBAR; TOPICAL ONCE
Status: CANCELLED | OUTPATIENT
Start: 2022-09-06 | End: 2022-09-06

## 2022-09-06 RX ORDER — TRAMADOL HYDROCHLORIDE 50 MG/1
1 TABLET ORAL EVERY 6 HOURS PRN
COMMUNITY
Start: 2022-08-12

## 2022-09-06 RX ORDER — PREDNISONE 10 MG/1
1 TABLET ORAL DAILY
COMMUNITY
Start: 2022-08-17 | End: 2022-10-07

## 2022-09-06 NOTE — H&P (VIEW-ONLY)
New Patient Pulmonary Office Visit      Patient Name: Shell Nayak    Referring Physician: Mary Mckeon APRN    Chief Complaint:    Chief Complaint   Patient presents with   • Shortness of Breath     New patient, shortness of breath       History of Present Illness: Shell Nayak is a 57 y.o. female who is here today to establish care with Pulmonary.  Patient's past medical history significant for hypothyroidism, Sjogren's syndrome, lupus, rheumatoid arthritis, and fibromyalgia.  She was referred to pulmonary for evaluation of shortness of breath.  That she started getting short of breath particularly after she had COVID back in October 2021.  She had previously been on rituximab and Plaquenil for her autoimmune disease.  She follows with Dr. Greer at [facility].  She states that ever since she had COVID her shortness of breath is gotten significantly worse.  She was told that she had some level of ILD related to her autoimmune diseases many years ago when she was initially diagnosed, but had no real issues.  After that time she had persistent infiltrates in the lower lobes bilaterally.  She has a frequent cough.  Has been hospitalized for pneumonia.  She initially was on a very high dose of steroids but eventually tapered down to 10 mg and the been on 10 mg ever since that time.  Denies any fever, chills, night sweats, nausea, or vomiting.  No other acute complaints.    Review of Systems:   Review of Systems   Constitutional: Positive for activity change and fatigue. Negative for appetite change, chills and diaphoresis.   HENT: Negative for congestion, postnasal drip, sinus pressure and voice change.    Eyes: Negative for blurred vision.   Respiratory: Positive for chest tightness and shortness of breath. Negative for cough and wheezing.    Cardiovascular: Negative for chest pain.   Gastrointestinal: Negative for abdominal pain.   Musculoskeletal: Negative for myalgias.   Skin:  Negative for color change and dry skin.   Allergic/Immunologic: Negative for environmental allergies.   Neurological: Negative for weakness and confusion.   Hematological: Negative for adenopathy.   Psychiatric/Behavioral: Negative for sleep disturbance and depressed mood.       Past Medical History:   Past Medical History:   Diagnosis Date   • Arthritis    • COPD (chronic obstructive pulmonary disease) (HCC)    • Disease of thyroid gland    • Rheumatoid arthritis (HCC)    • Sjogren's disease (HCC)        Past Surgical History:   Past Surgical History:   Procedure Laterality Date   • APPENDECTOMY     •  SECTION     • CHOLECYSTECTOMY     • LUNG BIOPSY     • THYROID BIOPSY         Family History:   Family History   Problem Relation Age of Onset   • Colon cancer Father    • Prostate cancer Father    • Breast cancer Maternal Grandmother    • Thyroid cancer Maternal Grandmother        Social History:   Social History     Socioeconomic History   • Marital status:    Tobacco Use   • Smoking status: Never Smoker   • Smokeless tobacco: Never Used   Vaping Use   • Vaping Use: Never used   Substance and Sexual Activity   • Alcohol use: Yes     Comment: 0-1 drink weekly   • Drug use: Never   • Sexual activity: Defer       Medications:     Current Outpatient Medications:   •  albuterol (PROVENTIL) (2.5 MG/3ML) 0.083% nebulizer solution, , Disp: , Rfl:   •  albuterol sulfate  (90 Base) MCG/ACT inhaler, , Disp: , Rfl:   •  B Complex-Biotin-FA (COMPLEX B-100 PO), Take 1 tablet by mouth Daily., Disp: , Rfl:   •  levothyroxine (SYNTHROID, LEVOTHROID) 112 MCG tablet, , Disp: , Rfl:   •  naproxen (NAPROSYN) 500 MG tablet, , Disp: , Rfl:   •  pilocarpine (SALAGEN) 5 MG tablet, Take 5 mg by mouth 3 (Three) Times a Day., Disp: , Rfl:   •  predniSONE (DELTASONE) 10 MG tablet, Take 1 tablet by mouth Daily., Disp: , Rfl:   •  pregabalin (LYRICA) 100 MG capsule, , Disp: , Rfl:   •  pregabalin (LYRICA) 150 MG capsule,  "Take 1 capsule by mouth Every 12 (Twelve) Hours., Disp: , Rfl:   •  sertraline (ZOLOFT) 100 MG tablet, , Disp: , Rfl:   •  Symbicort 160-4.5 MCG/ACT inhaler, , Disp: , Rfl:   •  traMADol (ULTRAM) 50 MG tablet, Take 1 tablet by mouth Every 6 (Six) Hours., Disp: , Rfl:   •  Zinc 50 MG tablet, Take 1 tablet by mouth Daily., Disp: , Rfl:   •  Cholecalciferol 125 MCG (5000 UT) tablet, Take 1 tablet by mouth Daily., Disp: , Rfl:     Allergies:   Allergies   Allergen Reactions   • Penicillins Rash     Hasn't had since childhood       Physical Exam:  Vital Signs:   Vitals:    09/06/22 1422   BP: 118/86   BP Location: Right arm   Patient Position: Sitting   Cuff Size: Adult   Pulse: 81   Temp: 97.7 °F (36.5 °C)   TempSrc: Infrared   SpO2: 98%  Comment: 3l continuous flow   Weight: 77.4 kg (170 lb 9.6 oz)   Height: 167.6 cm (66\")       Physical Exam  Vitals and nursing note reviewed.   Constitutional:       General: She is not in acute distress.     Appearance: She is well-developed and normal weight. She is not ill-appearing or toxic-appearing.   HENT:      Head: Normocephalic and atraumatic.   Cardiovascular:      Rate and Rhythm: Normal rate and regular rhythm.      Pulses: Normal pulses.      Heart sounds: Normal heart sounds. No murmur heard.    No friction rub. No gallop.   Pulmonary:      Effort: Pulmonary effort is normal. No respiratory distress.      Breath sounds: Normal breath sounds. No wheezing, rhonchi or rales.   Musculoskeletal:      Right lower leg: No edema.      Left lower leg: No edema.   Skin:     General: Skin is warm and dry.   Neurological:      Mental Status: She is alert and oriented to person, place, and time.         Immunization History   Administered Date(s) Administered   • COVID-19 (PFIZER) PURPLE CAP 04/16/2021, 05/07/2021, 01/24/2022   • Td 08/02/2021       Results Review:   - I personally reviewed the pts imaging from CT scan of the chest from August 2022 showed bilateral infiltrates in the " lower lobes, chest x-ray also consistent with this and low lung volumes.  Also reviewed the CT scan of the chest from May 2022 which also showed increased interstitial markings and infiltrates bilaterally, the CT scan of the chest in 2021 did not show this finding.  - I personally reviewed the pts PFT from 9/6/2022 showed severe restriction without restriction and a severely reduced DLCO.  - I personally reviewed the pts chart with regards to evaluation by rheumatology    Assessment / Plan:   1. SOB (shortness of breath) on exertion (Primary)  2. NSIP  3. Chronic respiratory failure with hypoxia (HCC)  4. Systemic lupus erythematosus with lung involvement, unspecified SLE type (HCC)  5. Rheumatoid arthritis involving multiple sites, unspecified whether rheumatoid factor present (HCC)  6. Sjogren's syndrome with lung involvement (HCC)  -I find concerning that the patient's infiltrates have not resolved over the last 9 months since she had COVID.  This is quite concerning.  I discussed with the patient that I would plan to do a bronchoscopy with BAL.  But after further evaluation of the case after the patient left I feel a better option would actually be due EBUS if she does have some lymph node enlargement in addition to possibly transbronchial biopsies.  The autoimmune disease that she is dealing with as well as the gradual progression and known Sjogren's syndrome is always a possibility that this could be some form of indolent lymphoma.  I think a biopsy is warranted to try to rule this out.  I also want to look for any signs of infection that we have not been treating.  If all of that is negative then we can consider increasing her steroids.  I will call and discussed this with the patient and then also make sure that we go over this again prior to the procedure.  Still planning for bronchoscopy next week.  Depending on the biopsy results will dictate further treatment.    Follow Up:   Return in about 4 weeks  (around 10/4/2022).     ALFA Dean, DO  Pulmonary and Critical Care Medicine  Note Electronically Signed    Part of this note may be an electronic transcription/translation of spoken language to printed text using the Dragon Dictation System.

## 2022-09-06 NOTE — PROGRESS NOTES
New Patient Pulmonary Office Visit      Patient Name: Shell Nayak    Referring Physician: Mary Mckeon APRN    Chief Complaint:    Chief Complaint   Patient presents with   • Shortness of Breath     New patient, shortness of breath       History of Present Illness: Shell Nayak is a 57 y.o. female who is here today to establish care with Pulmonary.  Patient's past medical history significant for hypothyroidism, Sjogren's syndrome, lupus, rheumatoid arthritis, and fibromyalgia.  She was referred to pulmonary for evaluation of shortness of breath.  That she started getting short of breath particularly after she had COVID back in October 2021.  She had previously been on rituximab and Plaquenil for her autoimmune disease.  She follows with Dr. Greer at the [facility].  She states that ever since she had COVID her shortness of breath is gotten significantly worse.  She was told that she had some level of ILD related to her autoimmune diseases many years ago when she was initially diagnosed, but had no real issues.  After that time she had persistent infiltrates in the lower lobes bilaterally.  She has a frequent cough.  Has been hospitalized for pneumonia.  She initially was on a very high dose of steroids but eventually tapered down to 10 mg and the been on 10 mg ever since that time.  Denies any fever, chills, night sweats, nausea, or vomiting.  No other acute complaints.    Review of Systems:   Review of Systems   Constitutional: Positive for activity change and fatigue. Negative for appetite change, chills and diaphoresis.   HENT: Negative for congestion, postnasal drip, sinus pressure and voice change.    Eyes: Negative for blurred vision.   Respiratory: Positive for chest tightness and shortness of breath. Negative for cough and wheezing.    Cardiovascular: Negative for chest pain.   Gastrointestinal: Negative for abdominal pain.   Musculoskeletal: Negative for myalgias.   Skin:  Negative for color change and dry skin.   Allergic/Immunologic: Negative for environmental allergies.   Neurological: Negative for weakness and confusion.   Hematological: Negative for adenopathy.   Psychiatric/Behavioral: Negative for sleep disturbance and depressed mood.       Past Medical History:   Past Medical History:   Diagnosis Date   • Arthritis    • COPD (chronic obstructive pulmonary disease) (HCC)    • Disease of thyroid gland    • Rheumatoid arthritis (HCC)    • Sjogren's disease (HCC)        Past Surgical History:   Past Surgical History:   Procedure Laterality Date   • APPENDECTOMY     •  SECTION     • CHOLECYSTECTOMY     • LUNG BIOPSY     • THYROID BIOPSY         Family History:   Family History   Problem Relation Age of Onset   • Colon cancer Father    • Prostate cancer Father    • Breast cancer Maternal Grandmother    • Thyroid cancer Maternal Grandmother        Social History:   Social History     Socioeconomic History   • Marital status:    Tobacco Use   • Smoking status: Never Smoker   • Smokeless tobacco: Never Used   Vaping Use   • Vaping Use: Never used   Substance and Sexual Activity   • Alcohol use: Yes     Comment: 0-1 drink weekly   • Drug use: Never   • Sexual activity: Defer       Medications:     Current Outpatient Medications:   •  albuterol (PROVENTIL) (2.5 MG/3ML) 0.083% nebulizer solution, , Disp: , Rfl:   •  albuterol sulfate  (90 Base) MCG/ACT inhaler, , Disp: , Rfl:   •  B Complex-Biotin-FA (COMPLEX B-100 PO), Take 1 tablet by mouth Daily., Disp: , Rfl:   •  levothyroxine (SYNTHROID, LEVOTHROID) 112 MCG tablet, , Disp: , Rfl:   •  naproxen (NAPROSYN) 500 MG tablet, , Disp: , Rfl:   •  pilocarpine (SALAGEN) 5 MG tablet, Take 5 mg by mouth 3 (Three) Times a Day., Disp: , Rfl:   •  predniSONE (DELTASONE) 10 MG tablet, Take 1 tablet by mouth Daily., Disp: , Rfl:   •  pregabalin (LYRICA) 100 MG capsule, , Disp: , Rfl:   •  pregabalin (LYRICA) 150 MG capsule,  "Take 1 capsule by mouth Every 12 (Twelve) Hours., Disp: , Rfl:   •  sertraline (ZOLOFT) 100 MG tablet, , Disp: , Rfl:   •  Symbicort 160-4.5 MCG/ACT inhaler, , Disp: , Rfl:   •  traMADol (ULTRAM) 50 MG tablet, Take 1 tablet by mouth Every 6 (Six) Hours., Disp: , Rfl:   •  Zinc 50 MG tablet, Take 1 tablet by mouth Daily., Disp: , Rfl:   •  Cholecalciferol 125 MCG (5000 UT) tablet, Take 1 tablet by mouth Daily., Disp: , Rfl:     Allergies:   Allergies   Allergen Reactions   • Penicillins Rash     Hasn't had since childhood       Physical Exam:  Vital Signs:   Vitals:    09/06/22 1422   BP: 118/86   BP Location: Right arm   Patient Position: Sitting   Cuff Size: Adult   Pulse: 81   Temp: 97.7 °F (36.5 °C)   TempSrc: Infrared   SpO2: 98%  Comment: 3l continuous flow   Weight: 77.4 kg (170 lb 9.6 oz)   Height: 167.6 cm (66\")       Physical Exam  Vitals and nursing note reviewed.   Constitutional:       General: She is not in acute distress.     Appearance: She is well-developed and normal weight. She is not ill-appearing or toxic-appearing.   HENT:      Head: Normocephalic and atraumatic.   Cardiovascular:      Rate and Rhythm: Normal rate and regular rhythm.      Pulses: Normal pulses.      Heart sounds: Normal heart sounds. No murmur heard.    No friction rub. No gallop.   Pulmonary:      Effort: Pulmonary effort is normal. No respiratory distress.      Breath sounds: Normal breath sounds. No wheezing, rhonchi or rales.   Musculoskeletal:      Right lower leg: No edema.      Left lower leg: No edema.   Skin:     General: Skin is warm and dry.   Neurological:      Mental Status: She is alert and oriented to person, place, and time.         Immunization History   Administered Date(s) Administered   • COVID-19 (PFIZER) PURPLE CAP 04/16/2021, 05/07/2021, 01/24/2022   • Td 08/02/2021       Results Review:   - I personally reviewed the pts imaging from CT scan of the chest from August 2022 showed bilateral infiltrates in the " lower lobes, chest x-ray also consistent with this and low lung volumes.  Also reviewed the CT scan of the chest from May 2022 which also showed increased interstitial markings and infiltrates bilaterally, the CT scan of the chest in 2021 did not show this finding.  - I personally reviewed the pts PFT from 9/6/2022 showed severe restriction without restriction and a severely reduced DLCO.  - I personally reviewed the pts chart with regards to evaluation by rheumatology    Assessment / Plan:   1. SOB (shortness of breath) on exertion (Primary)  2. NSIP  3. Chronic respiratory failure with hypoxia (HCC)  4. Systemic lupus erythematosus with lung involvement, unspecified SLE type (HCC)  5. Rheumatoid arthritis involving multiple sites, unspecified whether rheumatoid factor present (HCC)  6. Sjogren's syndrome with lung involvement (HCC)  -I find concerning that the patient's infiltrates have not resolved over the last 9 months since she had COVID.  This is quite concerning.  I discussed with the patient that I would plan to do a bronchoscopy with BAL.  But after further evaluation of the case after the patient left I feel a better option would actually be due EBUS if she does have some lymph node enlargement in addition to possibly transbronchial biopsies.  The autoimmune disease that she is dealing with as well as the gradual progression and known Sjogren's syndrome is always a possibility that this could be some form of indolent lymphoma.  I think a biopsy is warranted to try to rule this out.  I also want to look for any signs of infection that we have not been treating.  If all of that is negative then we can consider increasing her steroids.  I will call and discussed this with the patient and then also make sure that we go over this again prior to the procedure.  Still planning for bronchoscopy next week.  Depending on the biopsy results will dictate further treatment.    Follow Up:   Return in about 4 weeks  (around 10/4/2022).     ALFA Dean, DO  Pulmonary and Critical Care Medicine  Note Electronically Signed    Part of this note may be an electronic transcription/translation of spoken language to printed text using the Dragon Dictation System.

## 2022-09-13 ENCOUNTER — PRE-ADMISSION TESTING (OUTPATIENT)
Dept: PREADMISSION TESTING | Facility: HOSPITAL | Age: 57
End: 2022-09-13

## 2022-09-13 VITALS — WEIGHT: 174.6 LBS | HEIGHT: 66 IN | BODY MASS INDEX: 28.06 KG/M2

## 2022-09-13 DIAGNOSIS — J84.9 ILD (INTERSTITIAL LUNG DISEASE): ICD-10-CM

## 2022-09-13 LAB
ALBUMIN SERPL-MCNC: 3.1 G/DL (ref 3.5–5.2)
ALBUMIN/GLOB SERPL: 0.6 G/DL
ALP SERPL-CCNC: 64 U/L (ref 39–117)
ALT SERPL W P-5'-P-CCNC: 9 U/L (ref 1–33)
ANION GAP SERPL CALCULATED.3IONS-SCNC: 11 MMOL/L (ref 5–15)
APTT PPP: 27.6 SECONDS (ref 22–39)
AST SERPL-CCNC: 14 U/L (ref 1–32)
BASOPHILS # BLD AUTO: 0.02 10*3/MM3 (ref 0–0.2)
BASOPHILS NFR BLD AUTO: 0.5 % (ref 0–1.5)
BILIRUB SERPL-MCNC: 0.2 MG/DL (ref 0–1.2)
BUN SERPL-MCNC: 19 MG/DL (ref 6–20)
BUN/CREAT SERPL: 16.8 (ref 7–25)
CALCIUM SPEC-SCNC: 8.7 MG/DL (ref 8.6–10.5)
CHLORIDE SERPL-SCNC: 103 MMOL/L (ref 98–107)
CO2 SERPL-SCNC: 24 MMOL/L (ref 22–29)
CREAT SERPL-MCNC: 1.13 MG/DL (ref 0.57–1)
DEPRECATED RDW RBC AUTO: 43.5 FL (ref 37–54)
EGFRCR SERPLBLD CKD-EPI 2021: 57.6 ML/MIN/1.73
EOSINOPHIL # BLD AUTO: 0.07 10*3/MM3 (ref 0–0.4)
EOSINOPHIL NFR BLD AUTO: 1.8 % (ref 0.3–6.2)
ERYTHROCYTE [DISTWIDTH] IN BLOOD BY AUTOMATED COUNT: 13.1 % (ref 12.3–15.4)
GLOBULIN UR ELPH-MCNC: 4.9 GM/DL
GLUCOSE SERPL-MCNC: 71 MG/DL (ref 65–99)
HCT VFR BLD AUTO: 32.5 % (ref 34–46.6)
HGB BLD-MCNC: 10.7 G/DL (ref 12–15.9)
IMM GRANULOCYTES # BLD AUTO: 0.07 10*3/MM3 (ref 0–0.05)
IMM GRANULOCYTES NFR BLD AUTO: 1.8 % (ref 0–0.5)
INR PPP: 1.03 (ref 0.84–1.13)
LYMPHOCYTES # BLD AUTO: 0.32 10*3/MM3 (ref 0.7–3.1)
LYMPHOCYTES NFR BLD AUTO: 8.1 % (ref 19.6–45.3)
MCH RBC QN AUTO: 29.6 PG (ref 26.6–33)
MCHC RBC AUTO-ENTMCNC: 32.9 G/DL (ref 31.5–35.7)
MCV RBC AUTO: 90 FL (ref 79–97)
MONOCYTES # BLD AUTO: 0.12 10*3/MM3 (ref 0.1–0.9)
MONOCYTES NFR BLD AUTO: 3.1 % (ref 5–12)
NEUTROPHILS NFR BLD AUTO: 3.33 10*3/MM3 (ref 1.7–7)
NEUTROPHILS NFR BLD AUTO: 84.7 % (ref 42.7–76)
NRBC BLD AUTO-RTO: 0 /100 WBC (ref 0–0.2)
PLATELET # BLD AUTO: 461 10*3/MM3 (ref 140–450)
PMV BLD AUTO: 8.6 FL (ref 6–12)
POTASSIUM SERPL-SCNC: 3.6 MMOL/L (ref 3.5–5.2)
PROT SERPL-MCNC: 8 G/DL (ref 6–8.5)
PROTHROMBIN TIME: 13.4 SECONDS (ref 11.4–14.4)
RBC # BLD AUTO: 3.61 10*6/MM3 (ref 3.77–5.28)
SODIUM SERPL-SCNC: 138 MMOL/L (ref 136–145)
WBC NRBC COR # BLD: 3.93 10*3/MM3 (ref 3.4–10.8)

## 2022-09-13 PROCEDURE — 80053 COMPREHEN METABOLIC PANEL: CPT

## 2022-09-13 PROCEDURE — 36415 COLL VENOUS BLD VENIPUNCTURE: CPT

## 2022-09-13 PROCEDURE — 93005 ELECTROCARDIOGRAM TRACING: CPT

## 2022-09-13 PROCEDURE — 85025 COMPLETE CBC W/AUTO DIFF WBC: CPT

## 2022-09-13 PROCEDURE — 85610 PROTHROMBIN TIME: CPT

## 2022-09-13 PROCEDURE — 93010 ELECTROCARDIOGRAM REPORT: CPT | Performed by: INTERNAL MEDICINE

## 2022-09-13 PROCEDURE — 85730 THROMBOPLASTIN TIME PARTIAL: CPT

## 2022-09-13 NOTE — PAT
An arrival time for procedure was not provided during PAT visit. If patient had any questions or concerns about their arrival time, they were instructed to contact their surgeon/physician.  Additionally, if the patient referred to an arrival time that was acquired from their my chart account, patient was encouraged to verify that time with their surgeon/physician. Arrival times are NOT provided in Pre Admission Testing Department

## 2022-09-14 LAB
QT INTERVAL: 364 MS
QTC INTERVAL: 430 MS

## 2022-09-15 ENCOUNTER — APPOINTMENT (OUTPATIENT)
Dept: GENERAL RADIOLOGY | Facility: HOSPITAL | Age: 57
End: 2022-09-15

## 2022-09-15 ENCOUNTER — ANESTHESIA (OUTPATIENT)
Dept: GASTROENTEROLOGY | Facility: HOSPITAL | Age: 57
End: 2022-09-15

## 2022-09-15 ENCOUNTER — HOSPITAL ENCOUNTER (OUTPATIENT)
Facility: HOSPITAL | Age: 57
Setting detail: HOSPITAL OUTPATIENT SURGERY
Discharge: HOME | End: 2022-09-15
Attending: INTERNAL MEDICINE | Admitting: INTERNAL MEDICINE

## 2022-09-15 ENCOUNTER — ANESTHESIA EVENT (OUTPATIENT)
Dept: GASTROENTEROLOGY | Facility: HOSPITAL | Age: 57
End: 2022-09-15

## 2022-09-15 VITALS
DIASTOLIC BLOOD PRESSURE: 87 MMHG | HEART RATE: 112 BPM | OXYGEN SATURATION: 98 % | SYSTOLIC BLOOD PRESSURE: 134 MMHG | RESPIRATION RATE: 20 BRPM | TEMPERATURE: 98.1 F

## 2022-09-15 DIAGNOSIS — J84.9 ILD (INTERSTITIAL LUNG DISEASE): ICD-10-CM

## 2022-09-15 PROCEDURE — 31624 DX BRONCHOSCOPE/LAVAGE: CPT | Performed by: INTERNAL MEDICINE

## 2022-09-15 PROCEDURE — 87070 CULTURE OTHR SPECIMN AEROBIC: CPT | Performed by: INTERNAL MEDICINE

## 2022-09-15 PROCEDURE — C1726 CATH, BAL DIL, NON-VASCULAR: HCPCS | Performed by: INTERNAL MEDICINE

## 2022-09-15 PROCEDURE — 87205 SMEAR GRAM STAIN: CPT | Performed by: INTERNAL MEDICINE

## 2022-09-15 PROCEDURE — 25010000002 DEXAMETHASONE PER 1 MG: Performed by: NURSE ANESTHETIST, CERTIFIED REGISTERED

## 2022-09-15 PROCEDURE — 71045 X-RAY EXAM CHEST 1 VIEW: CPT

## 2022-09-15 PROCEDURE — 94799 UNLISTED PULMONARY SVC/PX: CPT

## 2022-09-15 PROCEDURE — 31628 BRONCHOSCOPY/LUNG BX EACH: CPT | Performed by: INTERNAL MEDICINE

## 2022-09-15 PROCEDURE — 94640 AIRWAY INHALATION TREATMENT: CPT

## 2022-09-15 PROCEDURE — 31652 BRONCH EBUS SAMPLNG 1/2 NODE: CPT | Performed by: INTERNAL MEDICINE

## 2022-09-15 PROCEDURE — 76000 FLUOROSCOPY <1 HR PHYS/QHP: CPT

## 2022-09-15 PROCEDURE — 88305 TISSUE EXAM BY PATHOLOGIST: CPT | Performed by: INTERNAL MEDICINE

## 2022-09-15 PROCEDURE — 25010000002 FENTANYL CITRATE (PF) 50 MCG/ML SOLUTION: Performed by: NURSE ANESTHETIST, CERTIFIED REGISTERED

## 2022-09-15 PROCEDURE — 87116 MYCOBACTERIA CULTURE: CPT | Performed by: INTERNAL MEDICINE

## 2022-09-15 PROCEDURE — 87102 FUNGUS ISOLATION CULTURE: CPT | Performed by: INTERNAL MEDICINE

## 2022-09-15 PROCEDURE — 25010000002 PROPOFOL 10 MG/ML EMULSION: Performed by: NURSE ANESTHETIST, CERTIFIED REGISTERED

## 2022-09-15 PROCEDURE — 25010000002 ONDANSETRON PER 1 MG: Performed by: NURSE ANESTHETIST, CERTIFIED REGISTERED

## 2022-09-15 PROCEDURE — 87305 ASPERGILLUS AG IA: CPT | Performed by: INTERNAL MEDICINE

## 2022-09-15 PROCEDURE — 87206 SMEAR FLUORESCENT/ACID STAI: CPT | Performed by: INTERNAL MEDICINE

## 2022-09-15 RX ORDER — ROCURONIUM BROMIDE 10 MG/ML
INJECTION, SOLUTION INTRAVENOUS AS NEEDED
Status: DISCONTINUED | OUTPATIENT
Start: 2022-09-15 | End: 2022-09-15 | Stop reason: SURG

## 2022-09-15 RX ORDER — BUPIVACAINE HCL/0.9 % NACL/PF 0.125 %
PLASTIC BAG, INJECTION (ML) EPIDURAL AS NEEDED
Status: DISCONTINUED | OUTPATIENT
Start: 2022-09-15 | End: 2022-09-15 | Stop reason: SURG

## 2022-09-15 RX ORDER — ALBUTEROL SULFATE 2.5 MG/3ML
2.5 SOLUTION RESPIRATORY (INHALATION) EVERY 6 HOURS PRN
Status: DISCONTINUED | OUTPATIENT
Start: 2022-09-15 | End: 2022-09-15 | Stop reason: HOSPADM

## 2022-09-15 RX ORDER — PROPOFOL 10 MG/ML
VIAL (ML) INTRAVENOUS AS NEEDED
Status: DISCONTINUED | OUTPATIENT
Start: 2022-09-15 | End: 2022-09-15 | Stop reason: SURG

## 2022-09-15 RX ORDER — FENTANYL CITRATE 50 UG/ML
INJECTION, SOLUTION INTRAMUSCULAR; INTRAVENOUS AS NEEDED
Status: DISCONTINUED | OUTPATIENT
Start: 2022-09-15 | End: 2022-09-15 | Stop reason: SURG

## 2022-09-15 RX ORDER — SODIUM CHLORIDE 0.9 % (FLUSH) 0.9 %
10 SYRINGE (ML) INJECTION AS NEEDED
Status: DISCONTINUED | OUTPATIENT
Start: 2022-09-15 | End: 2022-09-15 | Stop reason: HOSPADM

## 2022-09-15 RX ORDER — IPRATROPIUM BROMIDE AND ALBUTEROL SULFATE 2.5; .5 MG/3ML; MG/3ML
3 SOLUTION RESPIRATORY (INHALATION)
Status: DISCONTINUED | OUTPATIENT
Start: 2022-09-15 | End: 2022-09-15 | Stop reason: HOSPADM

## 2022-09-15 RX ORDER — SODIUM CHLORIDE 9 MG/ML
125 INJECTION, SOLUTION INTRAVENOUS CONTINUOUS
Status: DISCONTINUED | OUTPATIENT
Start: 2022-09-15 | End: 2022-09-15 | Stop reason: HOSPADM

## 2022-09-15 RX ORDER — EPHEDRINE SULFATE 50 MG/ML
INJECTION, SOLUTION INTRAVENOUS AS NEEDED
Status: DISCONTINUED | OUTPATIENT
Start: 2022-09-15 | End: 2022-09-15 | Stop reason: SURG

## 2022-09-15 RX ORDER — SODIUM CHLORIDE 9 MG/ML
INJECTION, SOLUTION INTRAVENOUS CONTINUOUS PRN
Status: DISCONTINUED | OUTPATIENT
Start: 2022-09-15 | End: 2022-09-15 | Stop reason: SURG

## 2022-09-15 RX ORDER — DEXAMETHASONE SODIUM PHOSPHATE 10 MG/ML
INJECTION INTRAMUSCULAR; INTRAVENOUS AS NEEDED
Status: DISCONTINUED | OUTPATIENT
Start: 2022-09-15 | End: 2022-09-15 | Stop reason: SURG

## 2022-09-15 RX ORDER — ONDANSETRON 2 MG/ML
INJECTION INTRAMUSCULAR; INTRAVENOUS AS NEEDED
Status: DISCONTINUED | OUTPATIENT
Start: 2022-09-15 | End: 2022-09-15 | Stop reason: SURG

## 2022-09-15 RX ORDER — LIDOCAINE HYDROCHLORIDE 20 MG/ML
INJECTION, SOLUTION INFILTRATION; PERINEURAL AS NEEDED
Status: DISCONTINUED | OUTPATIENT
Start: 2022-09-15 | End: 2022-09-15 | Stop reason: SURG

## 2022-09-15 RX ADMIN — LIDOCAINE HYDROCHLORIDE 80 MG: 20 INJECTION, SOLUTION INFILTRATION; PERINEURAL at 10:53

## 2022-09-15 RX ADMIN — Medication 100 MCG: at 11:21

## 2022-09-15 RX ADMIN — Medication 100 MCG: at 11:01

## 2022-09-15 RX ADMIN — Medication 10 ML: at 09:55

## 2022-09-15 RX ADMIN — DEXAMETHASONE SODIUM PHOSPHATE 10 MG: 10 INJECTION INTRAMUSCULAR; INTRAVENOUS at 11:00

## 2022-09-15 RX ADMIN — Medication 200 MCG: at 11:09

## 2022-09-15 RX ADMIN — Medication 200 MCG: at 11:03

## 2022-09-15 RX ADMIN — SUGAMMADEX 200 MG: 100 INJECTION, SOLUTION INTRAVENOUS at 11:28

## 2022-09-15 RX ADMIN — IPRATROPIUM BROMIDE AND ALBUTEROL SULFATE 3 ML: .5; 3 SOLUTION RESPIRATORY (INHALATION) at 11:56

## 2022-09-15 RX ADMIN — FENTANYL CITRATE 50 MCG: 50 INJECTION, SOLUTION INTRAMUSCULAR; INTRAVENOUS at 10:53

## 2022-09-15 RX ADMIN — Medication 200 MCG: at 11:06

## 2022-09-15 RX ADMIN — SODIUM CHLORIDE 125 ML/HR: 9 INJECTION, SOLUTION INTRAVENOUS at 09:55

## 2022-09-15 RX ADMIN — EPHEDRINE SULFATE 10 MG: 50 INJECTION, SOLUTION INTRAVENOUS at 11:20

## 2022-09-15 RX ADMIN — Medication 100 MCG: at 11:00

## 2022-09-15 RX ADMIN — FENTANYL CITRATE 50 MCG: 50 INJECTION, SOLUTION INTRAMUSCULAR; INTRAVENOUS at 10:50

## 2022-09-15 RX ADMIN — ONDANSETRON 4 MG: 2 INJECTION INTRAMUSCULAR; INTRAVENOUS at 11:28

## 2022-09-15 RX ADMIN — PROPOFOL 150 MG: 10 INJECTION, EMULSION INTRAVENOUS at 10:53

## 2022-09-15 RX ADMIN — SODIUM CHLORIDE: 9 INJECTION, SOLUTION INTRAVENOUS at 10:53

## 2022-09-15 RX ADMIN — ROCURONIUM BROMIDE 50 MG: 50 INJECTION, SOLUTION INTRAVENOUS at 10:53

## 2022-09-15 NOTE — ANESTHESIA PREPROCEDURE EVALUATION
Anesthesia Evaluation     Patient summary reviewed and Nursing notes reviewed   no history of anesthetic complications:  NPO Solid Status: > 8 hours  NPO Liquid Status: > 2 hours           Airway   Mallampati: II  TM distance: >3 FB  Neck ROM: full  No difficulty expected  Dental - normal exam     Pulmonary    (+) asthma,shortness of breath, decreased breath sounds,   (-) COPD    ROS comment: INTERSTITIAL LUNG DZ, ON 3L O2 AT HOME  Cardiovascular   Exercise tolerance: poor (<4 METS)    ECG reviewed  Rhythm: regular  Rate: normal        Neuro/Psych  GI/Hepatic/Renal/Endo    (+)   thyroid problem hypothyroidism    Musculoskeletal     Abdominal   (+) obese,     Abdomen: soft.   Substance History      OB/GYN          Other   arthritis, blood dyscrasia anemia,                     Anesthesia Plan    ASA 3     general     intravenous induction     Anesthetic plan, risks, benefits, and alternatives have been provided, discussed and informed consent has been obtained with: patient.    Plan discussed with CRNA.        CODE STATUS:

## 2022-09-15 NOTE — ANESTHESIA POSTPROCEDURE EVALUATION
Patient: Shell aNyak    Procedure Summary     Date: 09/15/22 Room / Location:  [facility]    Anesthesia Start: 1049 Anesthesia Stop: 1144    Procedure: BRONCHOSCOPY WITH ENDOBRONCHIAL ULTRASOUND WITH TRANSBRONCHIAL BIOPSY (N/A Bronchus) Diagnosis:       ILD (interstitial lung disease) (HCC)      (ILD (interstitial lung disease) (HCC) [J84.9])    Surgeons: Vargas Dean DO Provider: Richard Blue MD    Anesthesia Type: general ASA Status: 3          Anesthesia Type: general    Vitals  Vitals Value Taken Time   /91 09/15/22 1140   Temp     Pulse 120 09/15/22 1143   Resp     SpO2 93 % 09/15/22 1143   Vitals shown include unvalidated device data.        Post Anesthesia Care and Evaluation    Patient location during evaluation: PACU  Patient participation: complete - patient participated  Level of consciousness: awake and alert  Pain management: adequate    Airway patency: patent  Anesthetic complications: No anesthetic complications  PONV Status: none  Cardiovascular status: hemodynamically stable and acceptable  Respiratory status: nonlabored ventilation, acceptable and nasal cannula  Hydration status: acceptable

## 2022-09-16 ENCOUNTER — PATIENT ROUNDING (BHMG ONLY) (OUTPATIENT)
Dept: PULMONOLOGY | Facility: CLINIC | Age: 57
End: 2022-09-16

## 2022-09-16 LAB
CYTO UR: NORMAL
GIE STN SPEC: NORMAL
LAB AP CASE REPORT: NORMAL
LAB AP CLINICAL INFORMATION: NORMAL
LAB AP DIAGNOSIS COMMENT: NORMAL
LAB AP FLOW CYTOMETRY SUMMARY: NORMAL
PATH REPORT.FINAL DX SPEC: NORMAL
PATH REPORT.GROSS SPEC: NORMAL

## 2022-09-16 NOTE — PROGRESS NOTES
September 16, 2022    Hello, may I speak with Shell Nayak?    My name is Christina      I am  with [facility]  [facility]  [address] [place] [id number] [phone].    Before we get started may I verify your date of birth? 1965    I am calling to officially welcome you to our practice and ask about your recent visit. Is this a good time to talk? YES    Tell me about your visit with us. What things went well?  It was a good appointment.  Still have sore throat, but better.  No Complaints.       We're always looking for ways to make our patients' experiences even better. Do you have recommendations on ways we may improve?  NO    Overall were you satisfied with your first visit to our practice? YES       I appreciate you taking the time to speak with me today. Is there anything else I can do for you? NOT AT THIS TIME.      Thank you, and have a great day.

## 2022-09-17 LAB
BACTERIA SPEC RESP CULT: NORMAL
GRAM STN SPEC: NORMAL

## 2022-09-19 LAB — REF LAB TEST METHOD: NORMAL

## 2022-09-21 LAB — GALACTOMANNAN AG SPEC IA-ACNC: 0.47 INDEX (ref 0–0.49)

## 2022-10-07 ENCOUNTER — OFFICE VISIT (OUTPATIENT)
Dept: PULMONOLOGY | Facility: CLINIC | Age: 57
End: 2022-10-07

## 2022-10-07 VITALS
SYSTOLIC BLOOD PRESSURE: 140 MMHG | OXYGEN SATURATION: 99 % | BODY MASS INDEX: 27.34 KG/M2 | TEMPERATURE: 97.1 F | RESPIRATION RATE: 18 BRPM | HEART RATE: 77 BPM | HEIGHT: 66 IN | WEIGHT: 170.1 LBS | DIASTOLIC BLOOD PRESSURE: 88 MMHG

## 2022-10-07 DIAGNOSIS — J96.11 CHRONIC RESPIRATORY FAILURE WITH HYPOXIA: ICD-10-CM

## 2022-10-07 DIAGNOSIS — M06.9 RHEUMATOID ARTHRITIS INVOLVING MULTIPLE SITES, UNSPECIFIED WHETHER RHEUMATOID FACTOR PRESENT: ICD-10-CM

## 2022-10-07 DIAGNOSIS — J84.9 ILD (INTERSTITIAL LUNG DISEASE): Primary | ICD-10-CM

## 2022-10-07 DIAGNOSIS — M35.02 SJOGREN'S SYNDROME WITH LUNG INVOLVEMENT: ICD-10-CM

## 2022-10-07 DIAGNOSIS — M32.13 SYSTEMIC LUPUS ERYTHEMATOSUS WITH LUNG INVOLVEMENT, UNSPECIFIED SLE TYPE: ICD-10-CM

## 2022-10-07 PROCEDURE — 99215 OFFICE O/P EST HI 40 MIN: CPT | Performed by: INTERNAL MEDICINE

## 2022-10-07 RX ORDER — PREDNISONE 20 MG/1
TABLET ORAL
Qty: 91 TABLET | Refills: 0 | Status: SHIPPED | OUTPATIENT
Start: 2022-10-07 | End: 2022-12-09

## 2022-10-07 RX ORDER — SULFAMETHOXAZOLE AND TRIMETHOPRIM 800; 160 MG/1; MG/1
1 TABLET ORAL DAILY
Qty: 90 TABLET | Refills: 3 | Status: SHIPPED | OUTPATIENT
Start: 2022-10-07

## 2022-10-07 NOTE — PROGRESS NOTES
"Follow Up Office Note       Patient Name: Shell Nayak    Referring Physician: No ref. provider found    Chief Complaint:    Chief Complaint   Patient presents with   • Shortness of Breath       History of Present Illness: Shell Nayak is a 57 y.o. female who is here today to follow-up care with Pulmonary.  Patient's past medical history significant for hypothyroidism, Sjogren's syndrome, lupus, rheumatoid arthritis, and fibromyalgia.   Patient states since last time I saw her she is about the same.  Continues on 3 L of oxygen.  Continues to be short of breath when she exerts herself.  Denies any chest pain, nausea, fever, or chills.  No acute infections or hospitalizations since last visit.    Review of Systems:   Review of Systems   Constitutional: Negative for chills, fatigue and fever.   HENT: Negative for congestion and voice change.    Eyes: Negative for blurred vision.   Respiratory: Positive for shortness of breath. Negative for cough and wheezing.    Cardiovascular: Negative for chest pain.   Skin: Negative for dry skin.   Hematological: Negative for adenopathy.   Psychiatric/Behavioral: Negative for agitation and depressed mood.       The following portions of the patient's history were reviewed and updated as appropriate: allergies, current medications, past family history, past medical history, past social history, past surgical history and problem list.    Physical Exam:  Vital Signs:   Vitals:    10/07/22 1426   BP: 140/88   BP Location: Right arm   Patient Position: Sitting   Cuff Size: Adult   Pulse: 77   Resp: 18   Temp: 97.1 °F (36.2 °C)   TempSrc: Infrared   SpO2: 99%   Weight: 77.2 kg (170 lb 1.6 oz)   Height: 167.6 cm (66\")   PF: Comment: resting room air       Physical Exam  Vitals and nursing note reviewed.   Constitutional:       General: She is not in acute distress.     Appearance: She is well-developed and normal weight. She is not ill-appearing or toxic-appearing. "   HENT:      Head: Normocephalic and atraumatic.   Cardiovascular:      Rate and Rhythm: Normal rate and regular rhythm.      Pulses: Normal pulses.      Heart sounds: Normal heart sounds. No murmur heard.    No friction rub. No gallop.   Pulmonary:      Effort: Pulmonary effort is normal. No respiratory distress.      Breath sounds: Normal breath sounds. No wheezing, rhonchi or rales.   Musculoskeletal:      Right lower leg: No edema.      Left lower leg: No edema.   Skin:     General: Skin is warm and dry.   Neurological:      Mental Status: She is alert and oriented to person, place, and time.         Immunization History   Administered Date(s) Administered   • COVID-19 (PFIZER) PURPLE CAP 04/16/2021, 05/07/2021, 01/24/2022   • Td 08/02/2021       Results Review:   - CT scan of the chest from August 2022 showed bilateral infiltrates in the lower lobes, chest x-ray also consistent with this and low lung volumes.  Also reviewed the CT scan of the chest from May 2022 which also showed increased interstitial markings and infiltrates bilaterally, the CT scan of the chest in 2021 did not show this finding.  - PFT from 9/6/2022 showed severe restriction without restriction and a severely reduced DLCO.  -I personally reviewed the patient's pathology results from 9/15/2022, station 7 lymph node showed benign reactive lymph node, and right lower lobe transbronchial biopsy show obviated lung with mild chronic inflammation and focal fibrosis, negative for granulomas, atypia or malignancy.  -I personally reviewed the patient's culture results from bronchoscopy on 9/15/2022, fungus culture, Aspergillus galactomannan, AFB, and respiratory culture were all negative.    Assessment / Plan:   1. NSIP (Primary)  2. Chronic respiratory failure with hypoxia (HCC)  3. Systemic lupus erythematosus with lung involvement, unspecified SLE type (HCC)  4. Rheumatoid arthritis involving multiple sites, unspecified whether rheumatoid factor  present (HCC)  5. Sjogren's syndrome with lung involvement (HCC)  -I reviewed the patient's pathology results and culture results with her.  I am concerned that she has interstitial lung disease related to her underlying autoimmune process.  For now we will plan to do the prednisone taper as noted below.  She has had some progression of the fibrotic changes in the lower portions of her lung since 2021.  Although ultimately has been stable over the last couple of CT scans.  I think it is actually quite important for long-term though to get back on her immune suppression medication.  She had previously been on rituximab, but stopped it due to the fact that she was concerned about getting COVID and I think over time that is led to her getting progression of her underlying lung disease.  She sees Dr. Greer over at the [facility] and I will send my note over to their office for him to review.  I did tell her that I would trust Dr. Greer decision making to decide on which ever drug he felt was the most appropriate.  - predniSONE (DELTASONE) 20 MG tablet; Take 3 tablets by mouth Daily for 7 days, THEN 2 tablets Daily for 14 days, THEN 1.5 tablets Daily for 14 days, THEN 1 tablet Daily for 14 days, THEN 0.5 tablets Daily for 14 days.  Dispense: 91 tablet; Refill: 0  - sulfamethoxazole-trimethoprim for PCP prophylaxis (BACTRIM DS,SEPTRA DS) 800-160 MG per tablet; Take 1 tablet by mouth Daily.  Dispense: 90 tablet; Refill: 3  -I also cautioned her quite strongly about anytime she is in a ER to question whether or not she actually has pneumonia.  She has a clearly abnormal chest x-ray for the last couple years with scarring in the bases so there will always appear to be that there is some disease in that area.  If she does not have an elevated white blood cell count or fever that I would highly question the diagnosis.  Shortness of breath could come and go or she could have flares of her underlying autoimmune  disease process that could make her more short of breath.  I would highly recommend that she at least be evaluated with myself or pulmonologist before stopping her autoimmune medications again.  If she is truly having pneumonia then we will obviously want to stop them, but without previous imaging to compare this can easily be mistaken as a new pneumonia in the lower lobes but that is just her chronic disease.  -I wanted to continue her oxygen therapy 2 to 3 L nasal cannula, and get plenty of exercise at least 30 minutes of cardiovascular exercise per day, goal is to maintain a saturation greater than 88%.    Level of service justified based on 41 minutes spent in patient care on this date of service including, but not limited to: preparing to see the patient, obtaining and/or reviewing history, performing medically appropriate examination, ordering tests/medicine/procedures, independently interpreting results, documenting clinical information in EHR, and counseling/education of patient/family/caregiver (Excluding time spent on other separate services such as performing procedures or test interpretation, if applicable). (Level 4 30-39 minutes; Level 5 40-54 minutes)    Follow Up:   Return in about 6 months (around 4/7/2023) for PFTs, 6-minute walk test.       ALFA Dean, DO  Pulmonary and Critical Care Medicine  Note Electronically Signed    Part of this note may be an electronic transcription/translation of spoken language to printed text using the Dragon Dictation System.

## 2022-10-27 LAB
FUNGUS WND CULT: NORMAL
MYCOBACTERIUM SPEC CULT: NORMAL
NIGHT BLUE STAIN TISS: NORMAL

## 2022-12-05 ENCOUNTER — LAB (OUTPATIENT)
Dept: LAB | Facility: HOSPITAL | Age: 57
End: 2022-12-05

## 2022-12-05 ENCOUNTER — OFFICE VISIT (OUTPATIENT)
Dept: ONCOLOGY | Facility: CLINIC | Age: 57
End: 2022-12-05

## 2022-12-05 VITALS
BODY MASS INDEX: 26.2 KG/M2 | OXYGEN SATURATION: 100 % | TEMPERATURE: 97 F | RESPIRATION RATE: 20 BRPM | SYSTOLIC BLOOD PRESSURE: 132 MMHG | HEIGHT: 66 IN | HEART RATE: 107 BPM | DIASTOLIC BLOOD PRESSURE: 81 MMHG | WEIGHT: 163 LBS

## 2022-12-05 DIAGNOSIS — D50.8 OTHER IRON DEFICIENCY ANEMIA: Primary | ICD-10-CM

## 2022-12-05 DIAGNOSIS — D50.8 OTHER IRON DEFICIENCY ANEMIA: ICD-10-CM

## 2022-12-05 LAB
ALBUMIN SERPL-MCNC: 3.3 G/DL (ref 3.5–5.2)
ALBUMIN/GLOB SERPL: 0.6 G/DL
ALP SERPL-CCNC: 62 U/L (ref 39–117)
ALT SERPL W P-5'-P-CCNC: 9 U/L (ref 1–33)
ANION GAP SERPL CALCULATED.3IONS-SCNC: 10 MMOL/L (ref 5–15)
AST SERPL-CCNC: 16 U/L (ref 1–32)
BASOPHILS # BLD AUTO: 0.01 10*3/MM3 (ref 0–0.2)
BASOPHILS NFR BLD AUTO: 0.2 % (ref 0–1.5)
BILIRUB SERPL-MCNC: 0.2 MG/DL (ref 0–1.2)
BUN SERPL-MCNC: 16 MG/DL (ref 6–20)
BUN/CREAT SERPL: 14 (ref 7–25)
CALCIUM SPEC-SCNC: 9.5 MG/DL (ref 8.6–10.5)
CHLORIDE SERPL-SCNC: 101 MMOL/L (ref 98–107)
CO2 SERPL-SCNC: 23 MMOL/L (ref 22–29)
CREAT SERPL-MCNC: 1.14 MG/DL (ref 0.57–1)
DEPRECATED RDW RBC AUTO: 56.9 FL (ref 37–54)
EGFRCR SERPLBLD CKD-EPI 2021: 57 ML/MIN/1.73
EOSINOPHIL # BLD AUTO: 0.01 10*3/MM3 (ref 0–0.4)
EOSINOPHIL NFR BLD AUTO: 0.2 % (ref 0.3–6.2)
ERYTHROCYTE [DISTWIDTH] IN BLOOD BY AUTOMATED COUNT: 16.9 % (ref 12.3–15.4)
FERRITIN SERPL-MCNC: 300.6 NG/ML (ref 13–150)
GLOBULIN UR ELPH-MCNC: 5.1 GM/DL
GLUCOSE SERPL-MCNC: 97 MG/DL (ref 65–99)
HAPTOGLOB SERPL-MCNC: 410 MG/DL (ref 30–200)
HCT VFR BLD AUTO: 27.6 % (ref 34–46.6)
HGB BLD-MCNC: 8.5 G/DL (ref 12–15.9)
IMM GRANULOCYTES # BLD AUTO: 0.06 10*3/MM3 (ref 0–0.05)
IMM GRANULOCYTES NFR BLD AUTO: 1.1 % (ref 0–0.5)
IRON 24H UR-MRATE: 34 MCG/DL (ref 37–145)
IRON SATN MFR SERPL: 15 % (ref 20–50)
LDH SERPL-CCNC: 165 U/L (ref 135–214)
LYMPHOCYTES # BLD AUTO: 0.34 10*3/MM3 (ref 0.7–3.1)
LYMPHOCYTES NFR BLD AUTO: 6.4 % (ref 19.6–45.3)
MCH RBC QN AUTO: 28.1 PG (ref 26.6–33)
MCHC RBC AUTO-ENTMCNC: 30.8 G/DL (ref 31.5–35.7)
MCV RBC AUTO: 91.1 FL (ref 79–97)
MONOCYTES # BLD AUTO: 0.19 10*3/MM3 (ref 0.1–0.9)
MONOCYTES NFR BLD AUTO: 3.6 % (ref 5–12)
NEUTROPHILS NFR BLD AUTO: 4.73 10*3/MM3 (ref 1.7–7)
NEUTROPHILS NFR BLD AUTO: 88.5 % (ref 42.7–76)
PLATELET # BLD AUTO: 471 10*3/MM3 (ref 140–450)
PMV BLD AUTO: 8.6 FL (ref 6–12)
POTASSIUM SERPL-SCNC: 4.6 MMOL/L (ref 3.5–5.2)
PROT SERPL-MCNC: 8.4 G/DL (ref 6–8.5)
RBC # BLD AUTO: 3.03 10*6/MM3 (ref 3.77–5.28)
RETICS # AUTO: 0.08 10*6/MM3 (ref 0.02–0.13)
RETICS/RBC NFR AUTO: 2.65 % (ref 0.7–1.9)
SODIUM SERPL-SCNC: 134 MMOL/L (ref 136–145)
TIBC SERPL-MCNC: 221 MCG/DL (ref 298–536)
TRANSFERRIN SERPL-MCNC: 148 MG/DL (ref 200–360)
WBC NRBC COR # BLD: 5.34 10*3/MM3 (ref 3.4–10.8)

## 2022-12-05 PROCEDURE — 36415 COLL VENOUS BLD VENIPUNCTURE: CPT

## 2022-12-05 PROCEDURE — 85060 BLOOD SMEAR INTERPRETATION: CPT

## 2022-12-05 PROCEDURE — 82728 ASSAY OF FERRITIN: CPT

## 2022-12-05 PROCEDURE — 85045 AUTOMATED RETICULOCYTE COUNT: CPT

## 2022-12-05 PROCEDURE — 99214 OFFICE O/P EST MOD 30 MIN: CPT | Performed by: INTERNAL MEDICINE

## 2022-12-05 PROCEDURE — 84466 ASSAY OF TRANSFERRIN: CPT

## 2022-12-05 PROCEDURE — 83615 LACTATE (LD) (LDH) ENZYME: CPT

## 2022-12-05 PROCEDURE — 80053 COMPREHEN METABOLIC PANEL: CPT

## 2022-12-05 PROCEDURE — 85025 COMPLETE CBC W/AUTO DIFF WBC: CPT

## 2022-12-05 PROCEDURE — 83010 ASSAY OF HAPTOGLOBIN QUANT: CPT

## 2022-12-05 PROCEDURE — 83540 ASSAY OF IRON: CPT

## 2022-12-05 RX ORDER — TRAMADOL HYDROCHLORIDE 50 MG/1
50 TABLET ORAL
COMMUNITY
Start: 2022-08-12 | End: 2022-12-05

## 2022-12-05 RX ORDER — FERROUS SULFATE 325(65) MG
325 TABLET ORAL
Qty: 30 TABLET | Refills: 5 | Status: SHIPPED | OUTPATIENT
Start: 2022-12-05

## 2022-12-05 RX ORDER — LEVOTHYROXINE SODIUM 112 UG/1
112 TABLET ORAL
COMMUNITY
Start: 2022-11-18

## 2022-12-05 NOTE — PROGRESS NOTES
Follow Up Office Visit      Date: 2022     Patient Name: Shell Nayak  MRN: [id number]  : 1965  Referring Physician: John Greer     Chief Complaint:  Follow-up for anemia     History of Present Illness: Shell Nayak is a pleasant 55 y.o. female past medical history of hypothyroidism, fibromyalgia, Sojourn syndrome, rheumatoid arthritis who presents today for evaluation of neutropenia. The patient is accompanied by their aunt who contributes to the history of their care.  The patient has been followed by rheumatology for her multiple arthritic conditions.  She has been on several medications including most recently rituximab.  Per chart review, she has had multiple CBCs checked over the past year which have been notable for a mild leukopenia of around 2.5-3.  ANC has been stable at 1500.  She denies any recurrent fevers or infections.  Denies any unexplained weight loss.  Does note night sweats that happen 2-3 times per month.  Also notes significant fatigue which has been worsening over the past several months.  Denies any easy bleeding or bruising episodes.  Denies any family history of leukemia or lymphoma.  She is anxious about her visit today but otherwise compliant with other home medications with no other major concerns.     Interval History:  Presents clinic for follow-up.  Continues to follow with rheumatology for rituximab infusions secondary to arthritis.   Was recently seen by her rheumatologist who checked labs which was notable for hemoglobin of 7.8.  Patient has been having worsening fatigue and tiredness.  Denies any dark tarry stools or cravings for ice.  Notes that she has never had a colonoscopy in her life.  Has never been on any iron supplementation    Oncology History:    Oncology/Hematology History    No history exists.       Subjective      Review of Systems:   Constitutional: Negative for fevers, chills, or weight loss  Eyes: Negative for blurred vision or  discharge         Ear/Nose/Throat: Negative for difficulty swallowing, sore throat, LAD                                                       Respiratory: Negative for cough, SOA, wheezing                                                                                        Cardiovascular: Negative for chest pain or palpitations                                                                  Gastrointestinal: Negative for nausea, vomiting or diarrhea                                                                     Genitourinary: Negative for dysuria or hematuria                                                                                           Musculoskeletal: Negative for any joint pains or muscle aches                                                                        Neurologic: Negative for any weakness, headaches, dizziness                                                                         Hematologic: Negative for any easy bleeding or bruising                                                                                   Psychiatric: Negative for anxiety or depression                          Past Medical History/Past Surgical History/ Family History/ Social History: Reviewed by me and unchanged from my previous documentation done on December 2021.     Medications:     Current Outpatient Medications:   •  levothyroxine (SYNTHROID, LEVOTHROID) 112 MCG tablet, Take 112 mcg by mouth., Disp: , Rfl:   •  albuterol (PROVENTIL) (2.5 MG/3ML) 0.083% nebulizer solution, Take 2.5 mg by nebulization Every 4 (Four) Hours As Needed., Disp: , Rfl:   •  albuterol sulfate  (90 Base) MCG/ACT inhaler, Inhale 2 puffs Every 4 (Four) Hours As Needed., Disp: , Rfl:   •  B Complex-Biotin-FA (COMPLEX B-100 PO), Take 1 tablet by mouth Daily., Disp: , Rfl:   •  Cholecalciferol 125 MCG (5000 UT) tablet, Take 1 tablet by mouth Daily., Disp: , Rfl:   •  ferrous sulfate 325 (65 FE) MG tablet, Take 1 tablet by  "mouth Daily With Breakfast., Disp: 30 tablet, Rfl: 5  •  levothyroxine (SYNTHROID, LEVOTHROID) 112 MCG tablet, Take 112 mcg by mouth Daily., Disp: , Rfl:   •  pilocarpine (SALAGEN) 5 MG tablet, Take 5 mg by mouth 3 (Three) Times a Day As Needed., Disp: , Rfl:   •  predniSONE (DELTASONE) 20 MG tablet, Take 3 tablets by mouth Daily for 7 days, THEN 2 tablets Daily for 14 days, THEN 1.5 tablets Daily for 14 days, THEN 1 tablet Daily for 14 days, THEN 0.5 tablets Daily for 14 days., Disp: 91 tablet, Rfl: 0  •  pregabalin (LYRICA) 150 MG capsule, Take 1 capsule by mouth Every 12 (Twelve) Hours., Disp: , Rfl:   •  sertraline (ZOLOFT) 100 MG tablet, Take 100 mg by mouth Daily., Disp: , Rfl:   •  sulfamethoxazole-trimethoprim (BACTRIM DS,SEPTRA DS) 800-160 MG per tablet, Take 1 tablet by mouth Daily., Disp: 90 tablet, Rfl: 3  •  Symbicort 160-4.5 MCG/ACT inhaler, Inhale 2 puffs 2 (Two) Times a Day., Disp: , Rfl:   •  traMADol (ULTRAM) 50 MG tablet, Take 1 tablet by mouth Every 6 (Six) Hours As Needed., Disp: , Rfl:   •  Zinc 50 MG tablet, Take 1 tablet by mouth Daily., Disp: , Rfl:     Allergies:   Allergies   Allergen Reactions   • Penicillins Rash     Hasn't had since childhood       Objective     Physical Exam:  Vital Signs:   Vitals:    12/05/22 1522   BP: 132/81   Pulse: 107   Resp: 20   Temp: 97 °F (36.1 °C)   TempSrc: Infrared   SpO2: 100%  Comment: 3L   Weight: 73.9 kg (163 lb)   Height: 167.6 cm (65.98\")   PainSc:   3     Pain Score    12/05/22 1522   PainSc:   3     ECOG Performance Status: 1 - Symptomatic but completely ambulatory    Constitutional: NAD, ECOG 1  Eyes: PERRLA, scleral anicteric  ENT: No LAD, no thyromegaly  Respiratory: CTAB, no wheezing, rales, rhonchi  Cardiovascular: RRR, no murmurs, pulses 2+ bilaterally  Abdomen: soft, NT/ND, no HSM  Musculoskeletal: strength 5/5 bilaterally, no c/c/e  Neurologic: A&O x 3, CN II-XII intact grossly    Results Review:   No visits with results within 2 Week(s) " from this visit.   Latest known visit with results is:   Admission on 09/15/2022, Discharged on 09/15/2022   Component Date Value Ref Range Status   • Reference Lab Report 09/15/2022    Final                    Value:[facility]  [address] [place] [address]  Phone: [phone] or [phone]  Fax: [phone]  Micha Lopez M.D., Medical Director    PATIENT NAME                        LABORATORY NO.  JOSE JOHNSON.            [id number]  [id number]                           AGE            SEX   SSN         CLIENT REF #  [facility]             57     1965 F     [id number]  [address]                REQUESTING M.D.   ATTENDING M.D..   [place] [address]                  DANNY CHANEL  DATE COLLECTED    DATE RECEIVED     DATE REPORTED  09/15/2022        09/15/2022        2022    DIAGNOSIS:  LYMPH NODE, FNA:  Negative for malignant cells.    MICROSCOPIC DESCRIPTION:  The specimen shows a polymorphic lymphoid population with marked crush  artifact.  The findings are most consistent with a benign reactive  lymphadenopathy; however, if the node is large, continues to increase                           in size or  fails to respond to antibiotics, excision to definitely exclude a low grade  lymphoma or Hodgkin's lymphoma is recommended.  Unfixed tissue should be  submitted in transport media for immunophenotyping by flow cytometry.    Professional interpretation rendered by Tania Cason D.O., F.C.A.P. at [employer] [employer], [address] [place] [address].    CLINICAL HISTORY:  ILD (interstitial lung disease)    SPECIMENS SUBMITTED:  LYMPH NODE, FNA    GROSS SPECIMEN DESCRIPTION:  31CC RED FLUID WITH SEDIMENT IN FIXATIVE  Cell block has been examined.    REVIEWED, DIAGNOSED AND ELECTRONICALLY  SIGNED BY:    Tania Cason D.O., F.C.A.P.  CPT CODES:  14328, 69620     • Case Report  09/15/2022    Final                    Value:Surgical Pathology Report                         Case: [id number]                                  Authorizing Provider:  Jermaine Boogiezachery       Collected:           09/15/2022 11:16 AM                                 DO Raad                                                                    Ordering Location:     [facility]   Received:            09/15/2022 12:10 PM                                 [facility]                                                                  Pathologist:           Taya Barclay DO                                                       Specimen:    Lung, Right Lower Lobe, RLL transbronchial bx for PATH and flow cytometry                 • Clinical Information 09/15/2022    Final                    Value:This result contains rich text formatting which cannot be displayed here.   • Final Diagnosis 09/15/2022    Final                    Value:This result contains rich text formatting which cannot be displayed here.   • Comment 09/15/2022    Final                    Value:This result contains rich text formatting which cannot be displayed here.   • Gross Description 09/15/2022    Final                    Value:This result contains rich text formatting which cannot be displayed here.   • Microscopic Description 09/15/2022    Final                    Value:This result contains rich text formatting which cannot be displayed here.   • Flow Cytometry Summary 09/15/2022    Final                    Value:This result contains rich text formatting which cannot be displayed here.   • Fungus Culture 09/15/2022 No fungus isolated at 6 weeks   Final   • Aspergillus Ag, BAL/Serum 09/15/2022 0.47  0.00 - 0.49 Index Final   • AFB Culture 09/15/2022 No AFB isolated at 6 weeks   Final   • AFB Stain 09/15/2022 No acid fast bacilli seen on concentrated smear   Final   • Fungal Stain 09/15/2022 No yeast or hyphal elements seen   Final   •  Respiratory Culture 09/15/2022 Scant growth (1+) Normal respiratory chiquita. No S. aureus or Pseudomonas aeruginosa detected. Final report.   Final   • Gram Stain 09/15/2022 Few (2+) WBCs per low power field   Final   • Gram Stain 09/15/2022 No Epithelial cells per low power field   Final   • Gram Stain 09/15/2022 No organisms seen   Final       No results found.    Assessment / Plan      Assessment/Plan:   1.  Anemia  -Hemoglobin 7.8 with her rheumatologist in November 2022  -We will check iron studies, LDH, haptoglobin, reticulocyte count, peripheral smear today  -We will plan to start oral iron 325 mg daily  -Have referred to GI for EGD/colonoscopy  -We will have low threshold to transition to IV iron should she not tolerate oral iron     2.  Sojourn syndrome/rheumatoid arthritis/fibromyalgia  -Followed by Dr. Greer with rheumatology  -Currently getting rituximab infusions     3. Other neutropenia (CMS/HCC) (Primary)  -Unclear etiology at this time.  Likely secondary to patient's underlying rheumatologic conditions as well as medication  -Per chart review WBC between 2.5-3 with ANC at 1500  -Patient currently asymptomatic with no significant constitutional symptoms except for fatigue  -Repeat CBC in May 2021 with a white count of 2.4 and an ANC of 1.49  -Iron studies, LDH, vitamin B12, folate, copper within normal limits  -HIV negative  -Peripheral smear with no immature cells or blast  -Peripheral flow showing a 20% population of monocytes which are nonspecific and could be reactive given her chronic history of arthritis  -Repeat CBC in December 2021 showing a WBC of 3.9 and an ANC of 3.1  -No further hematologic work-up required     4.  Dyspnea  -Following with pulmonology  -Stable today on 3 L      Follow Up:   Follow-up in 3 months     Lyle Metcalf MD  Hematology and Oncology     Please note that portions of this note may have been completed with a voice recognition program. Efforts were made to edit  the dictations, but occasionally words are mistranscribed.

## 2022-12-06 LAB
CYTOLOGIST CVX/VAG CYTO: NORMAL
PATH INTERP BLD-IMP: NORMAL

## 2022-12-12 ENCOUNTER — TELEPHONE (OUTPATIENT)
Dept: ONCOLOGY | Facility: CLINIC | Age: 57
End: 2022-12-12

## 2022-12-12 DIAGNOSIS — K90.9 MALABSORPTION OF IRON: ICD-10-CM

## 2022-12-12 DIAGNOSIS — D50.9 IRON DEFICIENCY ANEMIA, UNSPECIFIED IRON DEFICIENCY ANEMIA TYPE: Primary | ICD-10-CM

## 2022-12-12 RX ORDER — SODIUM CHLORIDE 9 MG/ML
250 INJECTION, SOLUTION INTRAVENOUS ONCE
OUTPATIENT
Start: 2022-12-22

## 2022-12-12 RX ORDER — SODIUM CHLORIDE 9 MG/ML
250 INJECTION, SOLUTION INTRAVENOUS ONCE
OUTPATIENT
Start: 2022-12-19

## 2022-12-12 RX ORDER — SODIUM CHLORIDE 9 MG/ML
250 INJECTION, SOLUTION INTRAVENOUS ONCE
OUTPATIENT
Start: 2022-12-23

## 2022-12-12 RX ORDER — SODIUM CHLORIDE 9 MG/ML
250 INJECTION, SOLUTION INTRAVENOUS ONCE
OUTPATIENT
Start: 2022-12-21

## 2022-12-12 RX ORDER — SODIUM CHLORIDE 9 MG/ML
250 INJECTION, SOLUTION INTRAVENOUS ONCE
OUTPATIENT
Start: 2022-12-20

## 2022-12-12 NOTE — TELEPHONE ENCOUNTER
Caller: Shell Nayak    Relationship: Self    Best call back number: [phone]    What is the best time to reach you: ANYTIME    Who are you requesting to speak with (clinical staff, provider,  specific staff member): CLINICAL     What was the call regarding: PT CALLING CANT TAKE THE IRON DR. PIERRE PRESCRIBED WOULD LIKE TO DO INFUSIONS AT [facility], PHONE NUMBER [phone]?    CALL TO DISCUSS    Do you require a callback: YES

## 2022-12-12 NOTE — TELEPHONE ENCOUNTER
Call to Shell to notify her that [facility] has agreed to do iron infusions ordered by Dr Metcalf.  Completed orders, waiting on authorization to fax to [facility].  Shell states understood

## 2023-02-06 ENCOUNTER — OFFICE VISIT (OUTPATIENT)
Dept: GASTROENTEROLOGY | Facility: CLINIC | Age: 58
End: 2023-02-06
Payer: COMMERCIAL

## 2023-02-06 VITALS
DIASTOLIC BLOOD PRESSURE: 78 MMHG | BODY MASS INDEX: 26.42 KG/M2 | HEIGHT: 66 IN | TEMPERATURE: 97.8 F | HEART RATE: 74 BPM | WEIGHT: 164.4 LBS | OXYGEN SATURATION: 98 % | SYSTOLIC BLOOD PRESSURE: 134 MMHG

## 2023-02-06 DIAGNOSIS — D64.9 ANEMIA, UNSPECIFIED TYPE: Primary | ICD-10-CM

## 2023-02-06 PROCEDURE — 99203 OFFICE O/P NEW LOW 30 MIN: CPT | Performed by: INTERNAL MEDICINE

## 2023-02-06 NOTE — PROGRESS NOTES
PCP: Yosi Mejias DO    Chief Complaint   Patient presents with   • Anemia        History of Present Illness:   Shell Nayak is a 57 y.o. female who presents to the GI clinic as a consult for anemia. Follows with Dr. Metcalf for pancytopenia including neutropenia. H/o autoimmune disease followed by rheumatology: sjogren's, lupus, RA.  She follows with Pulmonary, dr. Dean, after having covid and is on 3 liters nc 02 x 24 hours.   Denies overt gib loss. No melena, hematochezia, fever. Denies abdominal pain. Bowel habits are regular one x a day. Father with colon cancer in 60s.    Past Medical History:   Diagnosis Date   • COPD (chronic obstructive pulmonary disease) (HCC)     oxygen 3 liters    • Disease of thyroid gland    • History of pneumonia      and  (hospitalized)   • Rheumatoid arthritis (HCC)    • Sjogren's disease (HCC)    • Supplemental oxygen dependent     3 liters ()       Past Surgical History:   Procedure Laterality Date   • APPENDECTOMY     • BRONCHOSCOPY N/A 9/15/2022    Procedure: BRONCHOSCOPY WITH ENDOBRONCHIAL ULTRASOUND WITH TRANSBRONCHIAL BIOPSY;  Surgeon: Vargas Dean DO;  Location: [facility] ENDOSCOPY;  Service: Pulmonary;  Laterality: N/A;  EBUS balloon removed and intact   •  SECTION      x1   • CHOLECYSTECTOMY     • LUNG BIOPSY     • THYROID BIOPSY     • TUBAL ABDOMINAL LIGATION           Current Outpatient Medications:   •  albuterol (PROVENTIL) (2.5 MG/3ML) 0.083% nebulizer solution, Take 2.5 mg by nebulization Every 4 (Four) Hours As Needed., Disp: , Rfl:   •  albuterol sulfate  (90 Base) MCG/ACT inhaler, Inhale 2 puffs Every 4 (Four) Hours As Needed., Disp: , Rfl:   •  B Complex-Biotin-FA (COMPLEX B-100 PO), Take 1 tablet by mouth Daily., Disp: , Rfl:   •  Cholecalciferol 125 MCG (5000 UT) tablet, Take 1 tablet by mouth Daily., Disp: , Rfl:   •  ferrous sulfate 325 (65 FE) MG tablet, Take 1 tablet by mouth Daily With  Breakfast., Disp: 30 tablet, Rfl: 5  •  levothyroxine (SYNTHROID, LEVOTHROID) 112 MCG tablet, Take 112 mcg by mouth Daily., Disp: , Rfl:   •  levothyroxine (SYNTHROID, LEVOTHROID) 112 MCG tablet, Take 112 mcg by mouth., Disp: , Rfl:   •  pilocarpine (SALAGEN) 5 MG tablet, Take 5 mg by mouth 3 (Three) Times a Day As Needed., Disp: , Rfl:   •  pregabalin (LYRICA) 150 MG capsule, Take 1 capsule by mouth Every 12 (Twelve) Hours., Disp: , Rfl:   •  sertraline (ZOLOFT) 100 MG tablet, Take 100 mg by mouth Daily., Disp: , Rfl:   •  sulfamethoxazole-trimethoprim (BACTRIM DS,SEPTRA DS) 800-160 MG per tablet, Take 1 tablet by mouth Daily., Disp: 90 tablet, Rfl: 3  •  Symbicort 160-4.5 MCG/ACT inhaler, Inhale 2 puffs 2 (Two) Times a Day., Disp: , Rfl:   •  traMADol (ULTRAM) 50 MG tablet, Take 1 tablet by mouth Every 6 (Six) Hours As Needed., Disp: , Rfl:   •  Zinc 50 MG tablet, Take 1 tablet by mouth Daily., Disp: , Rfl:     Allergies   Allergen Reactions   • Penicillins Rash     Hasn't had since childhood       Family History   Problem Relation Age of Onset   • Colon cancer Father    • Prostate cancer Father    • Breast cancer Maternal Grandmother    • Thyroid cancer Maternal Grandmother        Social History     Socioeconomic History   • Marital status:    Tobacco Use   • Smoking status: Never   • Smokeless tobacco: Never   Vaping Use   • Vaping Use: Never used   Substance and Sexual Activity   • Alcohol use: Yes     Comment: 0-1 drink weekly   • Drug use: Never   • Sexual activity: Defer       Review of Systems  All other systems reviewed and are negative.    Vitals:    02/06/23 1525   BP: 134/78   Pulse: 74   Temp: 97.8 °F (36.6 °C)   SpO2: 98%       Physical Exam  General Appearance:  Vitals as above. no acute distress,chronic ill appearing with oxygen  Head/face:  Normocephalic, atraumatic  Eyes:   EOMI, no conjunctivitis or icterus   Nose/Sinuses:  Nares patent bilaterally without discharge or  lesions  Mouth/Throat:  Normal oral movements without dyskinesia  Neck:  trachea is midline, no thyromegaly  Lungs:  Normal work of breathing effort, no overt rales  Heart:  No overt JVD or type VI murmur  Abdomen:  Nondistended, no guarding or rebound tenderness  Neurologic:  Alert; no focal deficits; age appropriate behavior and speech  Psychiatric: mood and affect are congruent  Vascular: extremities without edema  Skin: no rash or cyanosis.      Assessment/Plan  1.) HCM  2.) Chronic respiratory failure, h/o acute on chronic, follows with pulmonary, 3 liters nc  Recommend colonoscopy    3.) Anemia  4.) pancytopenia  Follows with hematology and a few blood studies are pending.  Will proceed to egd/colonoscopy in the hospital    5.) Autoimmune disease: sjogren's disease, RA, lupus  Continue following up with rheumatology, Dr. Zoey Cason MD  2/6/2023

## 2023-03-06 ENCOUNTER — OFFICE VISIT (OUTPATIENT)
Dept: ONCOLOGY | Facility: CLINIC | Age: 58
End: 2023-03-06
Payer: COMMERCIAL

## 2023-03-06 ENCOUNTER — LAB (OUTPATIENT)
Dept: LAB | Facility: HOSPITAL | Age: 58
End: 2023-03-06
Payer: COMMERCIAL

## 2023-03-06 VITALS
WEIGHT: 163.9 LBS | HEIGHT: 66 IN | RESPIRATION RATE: 20 BRPM | BODY MASS INDEX: 26.34 KG/M2 | OXYGEN SATURATION: 98 % | HEART RATE: 85 BPM | DIASTOLIC BLOOD PRESSURE: 83 MMHG | TEMPERATURE: 96.8 F | SYSTOLIC BLOOD PRESSURE: 128 MMHG

## 2023-03-06 DIAGNOSIS — K90.9 MALABSORPTION OF IRON: Primary | ICD-10-CM

## 2023-03-06 DIAGNOSIS — D50.9 IRON DEFICIENCY ANEMIA, UNSPECIFIED IRON DEFICIENCY ANEMIA TYPE: ICD-10-CM

## 2023-03-06 LAB
BASOPHILS # BLD AUTO: 0.01 10*3/MM3 (ref 0–0.2)
BASOPHILS NFR BLD AUTO: 0.2 % (ref 0–1.5)
DEPRECATED RDW RBC AUTO: 48.6 FL (ref 37–54)
EOSINOPHIL # BLD AUTO: 0.01 10*3/MM3 (ref 0–0.4)
EOSINOPHIL NFR BLD AUTO: 0.2 % (ref 0.3–6.2)
ERYTHROCYTE [DISTWIDTH] IN BLOOD BY AUTOMATED COUNT: 14.5 % (ref 12.3–15.4)
FERRITIN SERPL-MCNC: 615.9 NG/ML (ref 13–150)
HCT VFR BLD AUTO: 33.9 % (ref 34–46.6)
HGB BLD-MCNC: 10.8 G/DL (ref 12–15.9)
IMM GRANULOCYTES # BLD AUTO: 0.01 10*3/MM3 (ref 0–0.05)
IMM GRANULOCYTES NFR BLD AUTO: 0.2 % (ref 0–0.5)
IRON 24H UR-MRATE: 30 MCG/DL (ref 37–145)
IRON SATN MFR SERPL: 13 % (ref 20–50)
LYMPHOCYTES # BLD AUTO: 0.28 10*3/MM3 (ref 0.7–3.1)
LYMPHOCYTES NFR BLD AUTO: 5.3 % (ref 19.6–45.3)
MCH RBC QN AUTO: 28.8 PG (ref 26.6–33)
MCHC RBC AUTO-ENTMCNC: 31.9 G/DL (ref 31.5–35.7)
MCV RBC AUTO: 90.4 FL (ref 79–97)
MONOCYTES # BLD AUTO: 0.3 10*3/MM3 (ref 0.1–0.9)
MONOCYTES NFR BLD AUTO: 5.7 % (ref 5–12)
NEUTROPHILS NFR BLD AUTO: 4.67 10*3/MM3 (ref 1.7–7)
NEUTROPHILS NFR BLD AUTO: 88.4 % (ref 42.7–76)
PLATELET # BLD AUTO: 361 10*3/MM3 (ref 140–450)
PMV BLD AUTO: 8.7 FL (ref 6–12)
RBC # BLD AUTO: 3.75 10*6/MM3 (ref 3.77–5.28)
TIBC SERPL-MCNC: 238 MCG/DL (ref 298–536)
TRANSFERRIN SERPL-MCNC: 160 MG/DL (ref 200–360)
WBC NRBC COR # BLD: 5.28 10*3/MM3 (ref 3.4–10.8)

## 2023-03-06 PROCEDURE — 99214 OFFICE O/P EST MOD 30 MIN: CPT | Performed by: INTERNAL MEDICINE

## 2023-03-06 PROCEDURE — 83540 ASSAY OF IRON: CPT

## 2023-03-06 PROCEDURE — 85025 COMPLETE CBC W/AUTO DIFF WBC: CPT

## 2023-03-06 PROCEDURE — 82728 ASSAY OF FERRITIN: CPT

## 2023-03-06 PROCEDURE — 84466 ASSAY OF TRANSFERRIN: CPT

## 2023-03-06 PROCEDURE — 36415 COLL VENOUS BLD VENIPUNCTURE: CPT

## 2023-03-06 RX ORDER — RITUXIMAB-ABBS 10 MG/ML
INJECTION, SOLUTION INTRAVENOUS
COMMUNITY
Start: 2022-12-19

## 2023-03-06 RX ORDER — TRAMADOL HYDROCHLORIDE 50 MG/1
1 TABLET ORAL EVERY 6 HOURS
COMMUNITY
Start: 2022-11-21

## 2023-03-06 RX ORDER — RITUXIMAB 10 MG/ML
INJECTION, SOLUTION INTRAVENOUS
COMMUNITY
Start: 2022-11-21

## 2023-03-06 RX ORDER — PREDNISONE 10 MG/1
1 TABLET ORAL EVERY 24 HOURS
COMMUNITY
Start: 2022-11-21

## 2023-03-06 RX ORDER — NAPROXEN 500 MG/1
TABLET ORAL
COMMUNITY
Start: 2023-01-23

## 2023-03-06 RX ORDER — PILOCARPINE HYDROCHLORIDE 5 MG/1
1 TABLET, FILM COATED ORAL EVERY 12 HOURS SCHEDULED
COMMUNITY
Start: 2022-11-21

## 2023-03-06 NOTE — PROGRESS NOTES
Follow Up Office Visit      Date: 2023     Patient Name: Shell Nayak  MRN: [id number]  : 1965  Referring Physician: John Greer     Chief Complaint:  Follow-up for anemia     History of Present Illness: Shell Nayak is a pleasant 55 y.o. female past medical history of hypothyroidism, fibromyalgia, Sojourn syndrome, rheumatoid arthritis who presents today for evaluation of neutropenia. The patient is accompanied by their aunt who contributes to the history of their care.  The patient has been followed by rheumatology for her multiple arthritic conditions.  She has been on several medications including most recently rituximab.  Per chart review, she has had multiple CBCs checked over the past year which have been notable for a mild leukopenia of around 2.5-3.  ANC has been stable at 1500.  She denies any recurrent fevers or infections.  Denies any unexplained weight loss.  Does note night sweats that happen 2-3 times per month.  Also notes significant fatigue which has been worsening over the past several months.  Denies any easy bleeding or bruising episodes.  Denies any family history of leukemia or lymphoma.  She is anxious about her visit today but otherwise compliant with other home medications with no other major concerns.     Interval History:  Presents clinic for follow-up.  Status post IV Venofer in 2022.  Noted increased energy levels after the infusions.  Has noted some worsening fatigue over the past several weeks.  Remains compliant with all her other home medications    Oncology History:    Oncology/Hematology History    No history exists.       Subjective      Review of Systems:   Constitutional: Negative for fevers, chills, or weight loss  Eyes: Negative for blurred vision or discharge         Ear/Nose/Throat: Negative for difficulty swallowing, sore throat, LAD                                                       Respiratory: Negative for cough, SOA,  wheezing                                                                                        Cardiovascular: Negative for chest pain or palpitations                                                                  Gastrointestinal: Negative for nausea, vomiting or diarrhea                                                                     Genitourinary: Negative for dysuria or hematuria                                                                                           Musculoskeletal: Negative for any joint pains or muscle aches                                                                        Neurologic: Negative for any weakness, headaches, dizziness                                                                         Hematologic: Negative for any easy bleeding or bruising                                                                                   Psychiatric: Negative for anxiety or depression                          Past Medical History/Past Surgical History/ Family History/ Social History: Reviewed by me and unchanged from my previous documentation done on December 2022.     Medications:     Current Outpatient Medications:   •  pilocarpine (SALAGEN) 5 MG tablet, Take 1 tablet by mouth Every 12 (Twelve) Hours., Disp: , Rfl:   •  predniSONE (DELTASONE) 10 MG tablet, Take 1 tablet by mouth Daily., Disp: , Rfl:   •  riTUXimab-abbs (Truxima) 100 MG/10ML solution injection, Administer TRUXIMA 1000mg IV, Disp: , Rfl:   •  albuterol (PROVENTIL) (2.5 MG/3ML) 0.083% nebulizer solution, Take 2.5 mg by nebulization Every 4 (Four) Hours As Needed., Disp: , Rfl:   •  albuterol sulfate  (90 Base) MCG/ACT inhaler, Inhale 2 puffs Every 4 (Four) Hours As Needed., Disp: , Rfl:   •  B Complex-Biotin-FA (COMPLEX B-100 PO), Take 1 tablet by mouth Daily., Disp: , Rfl:   •  Cholecalciferol 125 MCG (5000 UT) tablet, Take 1 tablet by mouth Daily., Disp: , Rfl:   •  ferrous sulfate 325 (65 FE) MG tablet,  "Take 1 tablet by mouth Daily With Breakfast., Disp: 30 tablet, Rfl: 5  •  levothyroxine (SYNTHROID, LEVOTHROID) 112 MCG tablet, Take 1 tablet by mouth Daily., Disp: , Rfl:   •  levothyroxine (SYNTHROID, LEVOTHROID) 112 MCG tablet, Take 112 mcg by mouth., Disp: , Rfl:   •  naproxen (NAPROSYN) 500 MG tablet, , Disp: , Rfl:   •  pilocarpine (SALAGEN) 5 MG tablet, Take 1 tablet by mouth 3 (Three) Times a Day As Needed., Disp: , Rfl:   •  pregabalin (LYRICA) 150 MG capsule, Take 1 capsule by mouth Every 12 (Twelve) Hours., Disp: , Rfl:   •  riTUXimab (Rituxan) 100 MG/10ML solution injection, Administer RITUXAN 1000mg IV week 0, 2, then every 4 months, Disp: , Rfl:   •  sertraline (ZOLOFT) 100 MG tablet, Take 100 mg by mouth Daily., Disp: , Rfl:   •  sulfamethoxazole-trimethoprim (BACTRIM DS,SEPTRA DS) 800-160 MG per tablet, Take 1 tablet by mouth Daily., Disp: 90 tablet, Rfl: 3  •  Symbicort 160-4.5 MCG/ACT inhaler, Inhale 2 puffs 2 (Two) Times a Day., Disp: , Rfl:   •  traMADol (ULTRAM) 50 MG tablet, Take 1 tablet by mouth Every 6 (Six) Hours As Needed., Disp: , Rfl:   •  traMADol (ULTRAM) 50 MG tablet, Take 1 tablet by mouth Every 6 (Six) Hours., Disp: , Rfl:   •  Zinc 50 MG tablet, Take 1 tablet by mouth Daily., Disp: , Rfl:     Allergies:   Allergies   Allergen Reactions   • Penicillins Rash     Hasn't had since childhood       Objective     Physical Exam:  Vital Signs:   Vitals:    03/06/23 1403   BP: 128/83   Pulse: 85   Resp: 20   Temp: 96.8 °F (36 °C)   TempSrc: Infrared   SpO2: 98%   Weight: 74.3 kg (163 lb 14.4 oz)   Height: 167.6 cm (65.98\")   PainSc: 0-No pain     Pain Score    03/06/23 1403   PainSc: 0-No pain     ECOG Performance Status: 0 - Asymptomatic    Constitutional: NAD, ECOG 0  Eyes: PERRLA, scleral anicteric  ENT: No LAD, no thyromegaly  Respiratory: CTAB, no wheezing, rales, rhonchi  Cardiovascular: RRR, no murmurs, pulses 2+ bilaterally  Abdomen: soft, NT/ND, no HSM  Musculoskeletal: strength 5/5 " bilaterally, no c/c/e  Neurologic: A&O x 3, CN II-XII intact grossly    Results Review:   Lab on 03/06/2023   Component Date Value Ref Range Status   • WBC 03/06/2023 5.28  3.40 - 10.80 10*3/mm3 Final   • RBC 03/06/2023 3.75 (L)  3.77 - 5.28 10*6/mm3 Final   • Hemoglobin 03/06/2023 10.8 (L)  12.0 - 15.9 g/dL Final   • Hematocrit 03/06/2023 33.9 (L)  34.0 - 46.6 % Final   • MCV 03/06/2023 90.4  79.0 - 97.0 fL Final   • MCH 03/06/2023 28.8  26.6 - 33.0 pg Final   • MCHC 03/06/2023 31.9  31.5 - 35.7 g/dL Final   • RDW 03/06/2023 14.5  12.3 - 15.4 % Final   • RDW-SD 03/06/2023 48.6  37.0 - 54.0 fl Final   • MPV 03/06/2023 8.7  6.0 - 12.0 fL Final   • Platelets 03/06/2023 361  140 - 450 10*3/mm3 Final   • Neutrophil % 03/06/2023 88.4 (H)  42.7 - 76.0 % Final   • Lymphocyte % 03/06/2023 5.3 (L)  19.6 - 45.3 % Final   • Monocyte % 03/06/2023 5.7  5.0 - 12.0 % Final   • Eosinophil % 03/06/2023 0.2 (L)  0.3 - 6.2 % Final   • Basophil % 03/06/2023 0.2  0.0 - 1.5 % Final   • Immature Grans % 03/06/2023 0.2  0.0 - 0.5 % Final   • Neutrophils, Absolute 03/06/2023 4.67  1.70 - 7.00 10*3/mm3 Final   • Lymphocytes, Absolute 03/06/2023 0.28 (L)  0.70 - 3.10 10*3/mm3 Final   • Monocytes, Absolute 03/06/2023 0.30  0.10 - 0.90 10*3/mm3 Final   • Eosinophils, Absolute 03/06/2023 0.01  0.00 - 0.40 10*3/mm3 Final   • Basophils, Absolute 03/06/2023 0.01  0.00 - 0.20 10*3/mm3 Final   • Immature Grans, Absolute 03/06/2023 0.01  0.00 - 0.05 10*3/mm3 Final       No results found.    Assessment / Plan      Assessment/Plan:   Diagnoses and all orders for this visit:    1. Malabsorption of iron (Primary)/2. Iron deficiency anemia, unspecified iron deficiency anemia type  -Hemoglobin 7.8 with her rheumatologist in November 2022  -Iron studies consistent with iron deficiency anemia  -Initially started on oral iron but intolerant secondary to GI upset  -Status post IV Venofer in December 2022  -Repeat hemoglobin 10.8 in March 2023  -Patient has been  seen by Dr. Cason with plans for EGD/colonoscopy in the near future     2.  Sojourn syndrome/rheumatoid arthritis/fibromyalgia  -Followed by Dr. Greer with rheumatology  -Currently getting rituximab infusions      3. Other neutropenia (CMS/HCC) (Primary)  -Unclear etiology at this time.  Likely secondary to patient's underlying rheumatologic conditions as well as medication  -Per chart review WBC between 2.5-3 with ANC at 1500  -Patient currently asymptomatic with no significant constitutional symptoms except for fatigue  -Repeat CBC in May 2021 with a white count of 2.4 and an ANC of 1.49  -Iron studies, LDH, vitamin B12, folate, copper within normal limits  -HIV negative  -Peripheral smear with no immature cells or blast  -Peripheral flow showing a 20% population of monocytes which are nonspecific and could be reactive given her chronic history of arthritis  -Repeat CBC in December 2021 showing a WBC of 3.9 and an ANC of 3.1  -No further hematologic work-up required     4.  Dyspnea  -Following with pulmonology  -Stable today on 3 L       Follow Up:   Follow-up pending iron studies results     Lyle Metcalf MD  Hematology and Oncology     Please note that portions of this note may have been completed with a voice recognition program. Efforts were made to edit the dictations, but occasionally words are mistranscribed.

## 2023-03-07 ENCOUNTER — TELEPHONE (OUTPATIENT)
Dept: ONCOLOGY | Facility: CLINIC | Age: 58
End: 2023-03-07
Payer: COMMERCIAL

## 2023-03-07 NOTE — TELEPHONE ENCOUNTER
Call to Shell to notify that her iron levels are stable at this time.  Dr Metcalf would like Shell to follow up in 3 months with repeat lab work.  Notified scheduling and they will contact her with date and time.

## 2023-04-28 ENCOUNTER — APPOINTMENT (OUTPATIENT)
Dept: CT IMAGING | Facility: HOSPITAL | Age: 58
End: 2023-04-28
Payer: COMMERCIAL

## 2023-04-28 ENCOUNTER — APPOINTMENT (OUTPATIENT)
Dept: GENERAL RADIOLOGY | Facility: HOSPITAL | Age: 58
End: 2023-04-28
Payer: COMMERCIAL

## 2023-04-28 ENCOUNTER — HOSPITAL ENCOUNTER (EMERGENCY)
Facility: HOSPITAL | Age: 58
Discharge: HOME | End: 2023-04-29
Attending: EMERGENCY MEDICINE
Payer: COMMERCIAL

## 2023-04-28 VITALS
HEIGHT: 66 IN | DIASTOLIC BLOOD PRESSURE: 98 MMHG | RESPIRATION RATE: 24 BRPM | HEART RATE: 82 BPM | OXYGEN SATURATION: 99 % | TEMPERATURE: 98.4 F | BODY MASS INDEX: 24.75 KG/M2 | WEIGHT: 154 LBS | SYSTOLIC BLOOD PRESSURE: 152 MMHG

## 2023-04-28 DIAGNOSIS — J44.1 COPD WITH ACUTE EXACERBATION: ICD-10-CM

## 2023-04-28 DIAGNOSIS — R06.09 DYSPNEA ON EXERTION: Primary | ICD-10-CM

## 2023-04-28 LAB
ALBUMIN SERPL-MCNC: 3.4 G/DL (ref 3.5–5.2)
ALBUMIN/GLOB SERPL: 0.8 G/DL
ALP SERPL-CCNC: 76 U/L (ref 39–117)
ALT SERPL W P-5'-P-CCNC: <5 U/L (ref 1–33)
ANION GAP SERPL CALCULATED.3IONS-SCNC: 12 MMOL/L (ref 5–15)
AST SERPL-CCNC: 10 U/L (ref 1–32)
B PARAPERT DNA SPEC QL NAA+PROBE: NOT DETECTED
B PERT DNA SPEC QL NAA+PROBE: NOT DETECTED
BASOPHILS # BLD AUTO: 0.01 10*3/MM3 (ref 0–0.2)
BASOPHILS NFR BLD AUTO: 0.2 % (ref 0–1.5)
BILIRUB SERPL-MCNC: 0.4 MG/DL (ref 0–1.2)
BUN SERPL-MCNC: 9 MG/DL (ref 6–20)
BUN/CREAT SERPL: 8.8 (ref 7–25)
C PNEUM DNA NPH QL NAA+NON-PROBE: NOT DETECTED
CALCIUM SPEC-SCNC: 8.9 MG/DL (ref 8.6–10.5)
CHLORIDE SERPL-SCNC: 101 MMOL/L (ref 98–107)
CO2 SERPL-SCNC: 24 MMOL/L (ref 22–29)
CREAT SERPL-MCNC: 1.02 MG/DL (ref 0.57–1)
D DIMER PPP FEU-MCNC: 1.37 MCGFEU/ML (ref 0–0.55)
DEPRECATED RDW RBC AUTO: 45.3 FL (ref 37–54)
EGFRCR SERPLBLD CKD-EPI 2021: 65.1 ML/MIN/1.73
EOSINOPHIL # BLD AUTO: 0 10*3/MM3 (ref 0–0.4)
EOSINOPHIL NFR BLD AUTO: 0 % (ref 0.3–6.2)
ERYTHROCYTE [DISTWIDTH] IN BLOOD BY AUTOMATED COUNT: 13.9 % (ref 12.3–15.4)
FLUAV SUBTYP SPEC NAA+PROBE: NOT DETECTED
FLUBV RNA ISLT QL NAA+PROBE: NOT DETECTED
GEN 5 2HR TROPONIN T REFLEX: 9 NG/L
GLOBULIN UR ELPH-MCNC: 4.5 GM/DL
GLUCOSE SERPL-MCNC: 97 MG/DL (ref 65–99)
HADV DNA SPEC NAA+PROBE: NOT DETECTED
HCOV 229E RNA SPEC QL NAA+PROBE: NOT DETECTED
HCOV HKU1 RNA SPEC QL NAA+PROBE: NOT DETECTED
HCOV NL63 RNA SPEC QL NAA+PROBE: NOT DETECTED
HCOV OC43 RNA SPEC QL NAA+PROBE: NOT DETECTED
HCT VFR BLD AUTO: 37.8 % (ref 34–46.6)
HGB BLD-MCNC: 12 G/DL (ref 12–15.9)
HMPV RNA NPH QL NAA+NON-PROBE: NOT DETECTED
HOLD SPECIMEN: NORMAL
HPIV1 RNA ISLT QL NAA+PROBE: NOT DETECTED
HPIV2 RNA SPEC QL NAA+PROBE: NOT DETECTED
HPIV3 RNA NPH QL NAA+PROBE: NOT DETECTED
HPIV4 P GENE NPH QL NAA+PROBE: NOT DETECTED
IMM GRANULOCYTES # BLD AUTO: 0.02 10*3/MM3 (ref 0–0.05)
IMM GRANULOCYTES NFR BLD AUTO: 0.3 % (ref 0–0.5)
LIPASE SERPL-CCNC: 33 U/L (ref 13–60)
LYMPHOCYTES # BLD AUTO: 0.18 10*3/MM3 (ref 0.7–3.1)
LYMPHOCYTES NFR BLD AUTO: 3.1 % (ref 19.6–45.3)
M PNEUMO IGG SER IA-ACNC: NOT DETECTED
MCH RBC QN AUTO: 28.4 PG (ref 26.6–33)
MCHC RBC AUTO-ENTMCNC: 31.7 G/DL (ref 31.5–35.7)
MCV RBC AUTO: 89.4 FL (ref 79–97)
MONOCYTES # BLD AUTO: 0.21 10*3/MM3 (ref 0.1–0.9)
MONOCYTES NFR BLD AUTO: 3.6 % (ref 5–12)
NEUTROPHILS NFR BLD AUTO: 5.42 10*3/MM3 (ref 1.7–7)
NEUTROPHILS NFR BLD AUTO: 92.8 % (ref 42.7–76)
NRBC BLD AUTO-RTO: 0 /100 WBC (ref 0–0.2)
NT-PROBNP SERPL-MCNC: 784.9 PG/ML (ref 0–900)
PLATELET # BLD AUTO: 340 10*3/MM3 (ref 140–450)
PMV BLD AUTO: 8.8 FL (ref 6–12)
POTASSIUM SERPL-SCNC: 4.1 MMOL/L (ref 3.5–5.2)
PROT SERPL-MCNC: 7.9 G/DL (ref 6–8.5)
RBC # BLD AUTO: 4.23 10*6/MM3 (ref 3.77–5.28)
RHINOVIRUS RNA SPEC NAA+PROBE: NOT DETECTED
RSV RNA NPH QL NAA+NON-PROBE: NOT DETECTED
SARS-COV-2 RNA NPH QL NAA+NON-PROBE: NOT DETECTED
SODIUM SERPL-SCNC: 137 MMOL/L (ref 136–145)
TROPONIN T DELTA: -2 NG/L
TROPONIN T SERPL HS-MCNC: 11 NG/L
WBC NRBC COR # BLD: 5.84 10*3/MM3 (ref 3.4–10.8)
WHOLE BLOOD HOLD COAG: NORMAL
WHOLE BLOOD HOLD SPECIMEN: NORMAL

## 2023-04-28 PROCEDURE — 85379 FIBRIN DEGRADATION QUANT: CPT | Performed by: EMERGENCY MEDICINE

## 2023-04-28 PROCEDURE — 93005 ELECTROCARDIOGRAM TRACING: CPT | Performed by: EMERGENCY MEDICINE

## 2023-04-28 PROCEDURE — 71275 CT ANGIOGRAPHY CHEST: CPT

## 2023-04-28 PROCEDURE — 71045 X-RAY EXAM CHEST 1 VIEW: CPT

## 2023-04-28 PROCEDURE — 93005 ELECTROCARDIOGRAM TRACING: CPT

## 2023-04-28 PROCEDURE — 80053 COMPREHEN METABOLIC PANEL: CPT | Performed by: EMERGENCY MEDICINE

## 2023-04-28 PROCEDURE — 0202U NFCT DS 22 TRGT SARS-COV-2: CPT | Performed by: EMERGENCY MEDICINE

## 2023-04-28 PROCEDURE — 99284 EMERGENCY DEPT VISIT MOD MDM: CPT

## 2023-04-28 PROCEDURE — 84484 ASSAY OF TROPONIN QUANT: CPT | Performed by: EMERGENCY MEDICINE

## 2023-04-28 PROCEDURE — 83880 ASSAY OF NATRIURETIC PEPTIDE: CPT | Performed by: EMERGENCY MEDICINE

## 2023-04-28 PROCEDURE — 85025 COMPLETE CBC W/AUTO DIFF WBC: CPT | Performed by: EMERGENCY MEDICINE

## 2023-04-28 PROCEDURE — 25510000001 IOPAMIDOL PER 1 ML: Performed by: EMERGENCY MEDICINE

## 2023-04-28 PROCEDURE — 83690 ASSAY OF LIPASE: CPT | Performed by: EMERGENCY MEDICINE

## 2023-04-28 PROCEDURE — 36415 COLL VENOUS BLD VENIPUNCTURE: CPT

## 2023-04-28 RX ORDER — METHYLPREDNISOLONE SODIUM SUCCINATE 40 MG/ML
120 INJECTION, POWDER, LYOPHILIZED, FOR SOLUTION INTRAMUSCULAR; INTRAVENOUS ONCE
Status: COMPLETED | OUTPATIENT
Start: 2023-04-28 | End: 2023-04-29

## 2023-04-28 RX ORDER — SODIUM CHLORIDE 0.9 % (FLUSH) 0.9 %
10 SYRINGE (ML) INJECTION AS NEEDED
Status: DISCONTINUED | OUTPATIENT
Start: 2023-04-28 | End: 2023-04-29 | Stop reason: HOSPADM

## 2023-04-28 RX ORDER — ASPIRIN 81 MG/1
324 TABLET, CHEWABLE ORAL ONCE
Status: DISCONTINUED | OUTPATIENT
Start: 2023-04-28 | End: 2023-04-29 | Stop reason: HOSPADM

## 2023-04-28 RX ORDER — PREDNISONE 10 MG/1
10 TABLET ORAL DAILY
Qty: 60 TABLET | Refills: 0 | Status: SHIPPED | OUTPATIENT
Start: 2023-04-29

## 2023-04-28 RX ADMIN — IOPAMIDOL 80 ML: 755 INJECTION, SOLUTION INTRAVENOUS at 21:54

## 2023-04-29 LAB
QT INTERVAL: 326 MS
QT INTERVAL: 368 MS
QTC INTERVAL: 443 MS
QTC INTERVAL: 450 MS

## 2023-04-29 PROCEDURE — 96374 THER/PROPH/DIAG INJ IV PUSH: CPT

## 2023-04-29 PROCEDURE — 25010000002 METHYLPREDNISOLONE PER 40 MG: Performed by: EMERGENCY MEDICINE

## 2023-04-29 RX ADMIN — METHYLPREDNISOLONE SODIUM SUCCINATE 120 MG: 40 INJECTION INTRAMUSCULAR; INTRAVENOUS at 00:01

## 2023-04-29 NOTE — DISCHARGE INSTRUCTIONS
Follow up with your pulmonologist Monday May 1st as scheduled. Continue using your home oxygen and nebulizer treatments.

## 2023-04-29 NOTE — ED PROVIDER NOTES
Subjective   History of Present Illness  57-year-old female with history of COPD, Sjogren's syndrome, and rheumatoid arthritis, presents to the emergency department with concerns about chest pain for 2 days, and worsening chronic shortness of breath for the past 2 days.  Dyspnea is worse with exertion.  She has tried prednisone 10 mg daily as well as nebulizer treatments and she is on home oxygen which she has been using as directed.  She has an appointment with her pulmonologist scheduled for Monday, in 3 days, but felt worse and came to the emergency department.  She denies recent lower extremity swelling or calf pain.  She denies recent fever.            Past Medical History:   Diagnosis Date   • COPD (chronic obstructive pulmonary disease)     oxygen 3 liters    • Disease of thyroid gland    • History of pneumonia      and  (hospitalized)   • Rheumatoid arthritis    • Sjogren's disease    • Supplemental oxygen dependent     3 liters ()       Allergies   Allergen Reactions   • Penicillins Rash     Hasn't had since childhood       Past Surgical History:   Procedure Laterality Date   • APPENDECTOMY     • BRONCHOSCOPY N/A 9/15/2022    Procedure: BRONCHOSCOPY WITH ENDOBRONCHIAL ULTRASOUND WITH TRANSBRONCHIAL BIOPSY;  Surgeon: Vargas Dean DO;  Location: [facility] ENDOSCOPY;  Service: Pulmonary;  Laterality: N/A;  EBUS balloon removed and intact   •  SECTION      x1   • CHOLECYSTECTOMY     • LUNG BIOPSY     • THYROID BIOPSY     • TUBAL ABDOMINAL LIGATION         Family History   Problem Relation Age of Onset   • Colon cancer Father    • Prostate cancer Father    • Breast cancer Maternal Grandmother    • Thyroid cancer Maternal Grandmother        Social History     Socioeconomic History   • Marital status:    Tobacco Use   • Smoking status: Never   • Smokeless tobacco: Never   Vaping Use   • Vaping Use: Never used   Substance and Sexual Activity   • Alcohol use: Yes      Comment: 0-1 drink weekly   • Drug use: Never   • Sexual activity: Defer           Objective   Physical Exam  Vitals and nursing note reviewed.   Constitutional:       General: She is not in acute distress.     Appearance: She is not diaphoretic.      Comments: Chronically ill-appearing, appears older than stated age.  In no acute distress however.   Neck:      Comments: No meningismus.  Cardiovascular:      Comments: S1, S2, mild regular tachycardia.  No murmur, rub, or gallop.  Pulmonary:      Breath sounds: No stridor.      Comments: Bibasilar inspiratory rales.  Mild tachypnea.  No wheezing appreciated.  No prolonged expiratory phase.  Good air entry.  Musculoskeletal:      Cervical back: Neck supple.      Comments: No calf tenderness bilaterally.  No significant peripheral edema.   Skin:     General: Skin is warm and dry.      Coloration: Skin is not cyanotic.   Neurological:      Comments: Normal speech, no dysarthria.         Procedures           ED Course  ED Course as of 04/28/23 2344 Fri Apr 28, 2023 2340 Prior to discharge, results were discussed with the patient, and her mother who is at bedside.  All questions were answered.  Patient expresses understanding of the plan and has follow-up with her pulmonologist scheduled on Monday, in 3 days. [LD]      ED Course User Index  [LD] Anastasia Bond MD                                           Medical Decision Making  Differential diagnosis includes COPD exacerbation, pulmonary embolus, pneumonia, pulmonary fibrosis.    COPD with acute exacerbation: complicated acute illness or injury  Dyspnea on exertion: complicated acute illness or injury  Amount and/or Complexity of Data Reviewed  Labs: ordered. Decision-making details documented in ED Course.  Radiology: ordered. Decision-making details documented in ED Course.  ECG/medicine tests: ordered. Decision-making details documented in ED Course.     Details: EKG at 1643 shows sinus tachycardia at a rate of  111 bpm, normal intervals, nonspecific ST-T wave changes.  Repeat EKG at 1930 shows normal sinus rhythm at a rate of 90 bpm, normal intervals, nonspecific ST-T wave changes.      Risk  OTC drugs.  Prescription drug management.        Recent Results (from the past 24 hour(s))   ECG 12 Lead ED Triage Standing Order; Chest Pain    Collection Time: 04/28/23  4:43 PM   Result Value Ref Range    QT Interval 326 ms    QTC Interval 443 ms   High Sensitivity Troponin T    Collection Time: 04/28/23  5:17 PM    Specimen: Blood   Result Value Ref Range    HS Troponin T 11 (H) <10 ng/L   Comprehensive Metabolic Panel    Collection Time: 04/28/23  5:17 PM    Specimen: Blood   Result Value Ref Range    Glucose 97 65 - 99 mg/dL    BUN 9 6 - 20 mg/dL    Creatinine 1.02 (H) 0.57 - 1.00 mg/dL    Sodium 137 136 - 145 mmol/L    Potassium 4.1 3.5 - 5.2 mmol/L    Chloride 101 98 - 107 mmol/L    CO2 24.0 22.0 - 29.0 mmol/L    Calcium 8.9 8.6 - 10.5 mg/dL    Total Protein 7.9 6.0 - 8.5 g/dL    Albumin 3.4 (L) 3.5 - 5.2 g/dL    ALT (SGPT) <5 1 - 33 U/L    AST (SGOT) 10 1 - 32 U/L    Alkaline Phosphatase 76 39 - 117 U/L    Total Bilirubin 0.4 0.0 - 1.2 mg/dL    Globulin 4.5 gm/dL    A/G Ratio 0.8 g/dL    BUN/Creatinine Ratio 8.8 7.0 - 25.0    Anion Gap 12.0 5.0 - 15.0 mmol/L    eGFR 65.1 >60.0 mL/min/1.73   Lipase    Collection Time: 04/28/23  5:17 PM    Specimen: Blood   Result Value Ref Range    Lipase 33 13 - 60 U/L   BNP    Collection Time: 04/28/23  5:17 PM    Specimen: Blood   Result Value Ref Range    proBNP 784.9 0.0 - 900.0 pg/mL   Green Top (Gel)    Collection Time: 04/28/23  5:17 PM   Result Value Ref Range    Extra Tube Hold for add-ons.    Lavender Top    Collection Time: 04/28/23  5:17 PM   Result Value Ref Range    Extra Tube hold for add-on    Gold Top - SST    Collection Time: 04/28/23  5:17 PM   Result Value Ref Range    Extra Tube Hold for add-ons.    Gray Top    Collection Time: 04/28/23  5:17 PM   Result Value Ref Range     Extra Tube Hold for add-ons.    Light Blue Top    Collection Time: 04/28/23  5:17 PM   Result Value Ref Range    Extra Tube Hold for add-ons.    CBC Auto Differential    Collection Time: 04/28/23  5:17 PM    Specimen: Blood   Result Value Ref Range    WBC 5.84 3.40 - 10.80 10*3/mm3    RBC 4.23 3.77 - 5.28 10*6/mm3    Hemoglobin 12.0 12.0 - 15.9 g/dL    Hematocrit 37.8 34.0 - 46.6 %    MCV 89.4 79.0 - 97.0 fL    MCH 28.4 26.6 - 33.0 pg    MCHC 31.7 31.5 - 35.7 g/dL    RDW 13.9 12.3 - 15.4 %    RDW-SD 45.3 37.0 - 54.0 fl    MPV 8.8 6.0 - 12.0 fL    Platelets 340 140 - 450 10*3/mm3    Neutrophil % 92.8 (H) 42.7 - 76.0 %    Lymphocyte % 3.1 (L) 19.6 - 45.3 %    Monocyte % 3.6 (L) 5.0 - 12.0 %    Eosinophil % 0.0 (L) 0.3 - 6.2 %    Basophil % 0.2 0.0 - 1.5 %    Immature Grans % 0.3 0.0 - 0.5 %    Neutrophils, Absolute 5.42 1.70 - 7.00 10*3/mm3    Lymphocytes, Absolute 0.18 (L) 0.70 - 3.10 10*3/mm3    Monocytes, Absolute 0.21 0.10 - 0.90 10*3/mm3    Eosinophils, Absolute 0.00 0.00 - 0.40 10*3/mm3    Basophils, Absolute 0.01 0.00 - 0.20 10*3/mm3    Immature Grans, Absolute 0.02 0.00 - 0.05 10*3/mm3    nRBC 0.0 0.0 - 0.2 /100 WBC   D-dimer, Quantitative    Collection Time: 04/28/23  5:17 PM    Specimen: Blood   Result Value Ref Range    D-Dimer, Quantitative 1.37 (H) 0.00 - 0.55 MCGFEU/mL   Respiratory Panel PCR w/COVID-19(SARS-CoV-2) RAFAEL/AMIRA/KATHY/PAD/COR/MAD/SANDRA In-House, NP Swab in UTM/VTM, 3-4 HR TAT - Swab, Nasopharynx    Collection Time: 04/28/23  6:31 PM    Specimen: Nasopharynx; Swab   Result Value Ref Range    ADENOVIRUS, PCR Not Detected Not Detected    Coronavirus 229E Not Detected Not Detected    Coronavirus HKU1 Not Detected Not Detected    Coronavirus NL63 Not Detected Not Detected    Coronavirus OC43 Not Detected Not Detected    COVID19 Not Detected Not Detected - Ref. Range    Human Metapneumovirus Not Detected Not Detected    Human Rhinovirus/Enterovirus Not Detected Not Detected    Influenza A PCR Not  Detected Not Detected    Influenza B PCR Not Detected Not Detected    Parainfluenza Virus 1 Not Detected Not Detected    Parainfluenza Virus 2 Not Detected Not Detected    Parainfluenza Virus 3 Not Detected Not Detected    Parainfluenza Virus 4 Not Detected Not Detected    RSV, PCR Not Detected Not Detected    Bordetella pertussis pcr Not Detected Not Detected    Bordetella parapertussis PCR Not Detected Not Detected    Chlamydophila pneumoniae PCR Not Detected Not Detected    Mycoplasma pneumo by PCR Not Detected Not Detected   ECG 12 Lead ED Triage Standing Order; Chest Pain    Collection Time: 04/28/23  7:30 PM   Result Value Ref Range    QT Interval 368 ms    QTC Interval 450 ms   High Sensitivity Troponin T 2Hr    Collection Time: 04/28/23  7:36 PM    Specimen: Blood   Result Value Ref Range    HS Troponin T 9 <10 ng/L    Troponin T Delta -2 >=-4 - <+4 ng/L     Note: In addition to lab results from this visit, the labs listed above may include labs taken at another facility or during a different encounter within the last 24 hours. Please correlate lab times with ED admission and discharge times for further clarification of the services performed during this visit.    CT Angiogram Chest Pulmonary Embolism   Final Result   Impression:   No evidence of pulmonary embolism.      There are low lung volumes with linear/streaky bibasilar subsegmental atelectasis and/or scarring. There are trace bilateral pleural effusions. The appearance is similar to CTA of the chest from 8/15/2022.      There are subacute or chronic-appearing mild superior endplate compression deformities of T5, T9, and T12 which appear new from 8/15/2022 CT.            Electronically Signed: Emmanuel Wilks     4/28/2023 10:30 PM EDT     Workstation ID: [id number]      XR Chest 1 View   Final Result   Impression:   Low lung volumes with bibasilar airspace disease. These findings are present to some degree on the previous radiograph.          Electronically Signed: Jn Pantoja     4/28/2023 6:10 PM EDT     Workstation ID: [id number]        Vitals:    04/28/23 1900 04/28/23 1920 04/28/23 2200 04/28/23 2300   BP: 126/91 146/96 151/94 152/98   BP Location:       Patient Position:       Pulse: 91 98 83 82   Resp:       Temp:       TempSrc:       SpO2: 99%  100% 99%   Weight:       Height:         Medications   sodium chloride 0.9 % flush 10 mL (has no administration in time range)   aspirin chewable tablet 324 mg (324 mg Oral Not Given 4/28/23 2041)   sodium chloride 0.9 % flush 10 mL (has no administration in time range)   methylPREDNISolone sodium succinate (SOLU-Medrol) injection 120 mg (has no administration in time range)   iopamidol (ISOVUE-370) 76 % injection 100 mL (80 mL Intravenous Given 4/28/23 2154)     ECG/EMG Results (last 24 hours)     Procedure Component Value Units Date/Time    ECG 12 Lead ED Triage Standing Order; Chest Pain [[id number]] Collected: 04/28/23 1643     Updated: 04/28/23 1643     QT Interval 326 ms      QTC Interval 443 ms     Narrative:      Test Reason : ED Triage Standing Order~  Blood Pressure :   */*   mmHG  Vent. Rate : 111 BPM     Atrial Rate : 111 BPM     P-R Int : 130 ms          QRS Dur :  78 ms      QT Int : 326 ms       P-R-T Axes :  20  31  86 degrees     QTc Int : 443 ms    Sinus tachycardia  T wave abnormality, consider lateral ischemia  Abnormal ECG  When compared with ECG of 13-SEP-2022 15:47,  No significant change was found    Referred By: ED MD           Confirmed By:     ECG 12 Lead ED Triage Standing Order; Chest Pain [[id number]] Collected: 04/28/23 1930     Updated: 04/28/23 1930     QT Interval 368 ms      QTC Interval 450 ms     Narrative:      Test Reason : ED Triage Standing Order~  Blood Pressure :   */*   mmHG  Vent. Rate :  90 BPM     Atrial Rate :  90 BPM     P-R Int : 136 ms          QRS Dur :  76 ms      QT Int : 368 ms       P-R-T Axes :  14  28 103 degrees     QTc Int : 450 ms    Normal sinus  rhythm  T wave abnormality, consider lateral ischemia  Abnormal ECG  When compared with ECG of 28-APR-2023 16:43, (Unconfirmed)  No significant change was found    Referred By: EDMD           Confirmed By:         ECG 12 Lead ED Triage Standing Order; Chest Pain   Preliminary Result   Test Reason : ED Triage Standing Order~   Blood Pressure :   */*   mmHG   Vent. Rate :  90 BPM     Atrial Rate :  90 BPM      P-R Int : 136 ms          QRS Dur :  76 ms       QT Int : 368 ms       P-R-T Axes :  14  28 103 degrees      QTc Int : 450 ms      Normal sinus rhythm   T wave abnormality, consider lateral ischemia   Abnormal ECG   When compared with ECG of 28-APR-2023 16:43, (Unconfirmed)   No significant change was found      Referred By: EDMD           Confirmed By:       ECG 12 Lead ED Triage Standing Order; Chest Pain   Preliminary Result   Test Reason : ED Triage Standing Order~   Blood Pressure :   */*   mmHG   Vent. Rate : 111 BPM     Atrial Rate : 111 BPM      P-R Int : 130 ms          QRS Dur :  78 ms       QT Int : 326 ms       P-R-T Axes :  20  31  86 degrees      QTc Int : 443 ms      Sinus tachycardia   T wave abnormality, consider lateral ischemia   Abnormal ECG   When compared with ECG of 13-SEP-2022 15:47,   No significant change was found      Referred By: ED MD           Confirmed By:               Final diagnoses:   Dyspnea on exertion   COPD with acute exacerbation       ED Disposition  ED Disposition     ED Disposition   Discharge    Condition   Stable    Comment   --             Yosi Mejias, DO  [address] [facility] [address] [address] [phone]    In 1 week      Vargas Dean, DO  [address] [place] [address] [place] [id number]  [phone]    In 3 days  Monday May 1 as scheduled, your pulmonologist.         Medication List      Changed    * predniSONE 10 MG tablet  Commonly known as: DELTASONE  What changed: Another medication with the same name was added. Make sure you  understand how and when to take each.     * predniSONE 10 MG tablet  Commonly known as: DELTASONE  Take 1 tablet by mouth Daily. 6 tab PO daily for 3 days, then 5 tab PO daily for 3 days, then 4 tab PO daily for 3 days, then 3 tab PO daily for 3 days, then 2 tab PO daily x 3 days, then resume your usual dose of 1 tab PO daily.  Start taking on: April 29, 2023  What changed: You were already taking a medication with the same name, and this prescription was added. Make sure you understand how and when to take each.         * This list has 2 medication(s) that are the same as other medications prescribed for you. Read the directions carefully, and ask your doctor or other care provider to review them with you.               Where to Get Your Medications      These medications were sent to [address] - [phone] [facility] [phone] [address] [address]    Phone: [phone]   · predniSONE 10 MG tablet          Anastasia Bond MD  04/28/23 4014

## 2023-05-01 ENCOUNTER — OFFICE VISIT (OUTPATIENT)
Dept: PULMONOLOGY | Facility: CLINIC | Age: 58
End: 2023-05-01
Payer: COMMERCIAL

## 2023-05-01 VITALS
OXYGEN SATURATION: 100 % | TEMPERATURE: 98 F | SYSTOLIC BLOOD PRESSURE: 144 MMHG | HEART RATE: 62 BPM | BODY MASS INDEX: 25.07 KG/M2 | WEIGHT: 156 LBS | DIASTOLIC BLOOD PRESSURE: 86 MMHG | HEIGHT: 66 IN

## 2023-05-01 DIAGNOSIS — M06.9 RHEUMATOID ARTHRITIS INVOLVING MULTIPLE SITES, UNSPECIFIED WHETHER RHEUMATOID FACTOR PRESENT: ICD-10-CM

## 2023-05-01 DIAGNOSIS — M32.13 SYSTEMIC LUPUS ERYTHEMATOSUS WITH LUNG INVOLVEMENT, UNSPECIFIED SLE TYPE: ICD-10-CM

## 2023-05-01 DIAGNOSIS — J96.11 CHRONIC RESPIRATORY FAILURE WITH HYPOXIA: ICD-10-CM

## 2023-05-01 DIAGNOSIS — M35.02 SJOGREN'S SYNDROME WITH LUNG INVOLVEMENT: ICD-10-CM

## 2023-05-01 DIAGNOSIS — J84.9 ILD (INTERSTITIAL LUNG DISEASE): Primary | ICD-10-CM

## 2023-05-01 RX ORDER — PHENOL 1.4 %
10 AEROSOL, SPRAY (ML) MUCOUS MEMBRANE
COMMUNITY

## 2023-05-01 NOTE — PROGRESS NOTES
"Follow Up Office Note       Patient Name: Shell Nayak    Referring Physician: No ref. provider found    Chief Complaint:    Chief Complaint   Patient presents with   • Shortness of Breath     F/u        History of Present Illness: Shell Nayak is a 57 y.o. female who is here today to follow-up care with Pulmonary.    Patient's past medical history significant for hypothyroidism, Sjogren's syndrome, lupus, rheumatoid arthritis, and fibromyalgia.  She continues to have shortness of breath.  Does have some chest tightness at times.  Went to the ER last Friday where they increased her steroids and treated for lupus flare.  CT of the chest at that time was unremarkable other than the known scarring in the bases.  She did receive rituximab since I saw her last and thinks she gets another one in a couple weeks.  No other acute complaints.    Review of Systems:   Review of Systems   Constitutional: Positive for fatigue. Negative for chills and fever.   HENT: Negative for congestion and voice change.    Eyes: Negative for blurred vision.   Respiratory: Positive for shortness of breath. Negative for cough and wheezing.    Cardiovascular: Positive for chest pain.   Skin: Negative for dry skin.   Hematological: Negative for adenopathy.   Psychiatric/Behavioral: Negative for agitation and depressed mood.       The following portions of the patient's history were reviewed and updated as appropriate: allergies, current medications, past family history, past medical history, past social history, past surgical history and problem list.    Physical Exam:  Vital Signs:   Vitals:    05/01/23 1233   BP: 144/86   BP Location: Left arm   Patient Position: Sitting   Cuff Size: Adult   Pulse: 62   Temp: 98 °F (36.7 °C)   TempSrc: Infrared   SpO2: 100%  Comment: 3 liters   Weight: 70.8 kg (156 lb)   Height: 167.6 cm (65.98\")       Physical Exam  Vitals and nursing note reviewed.   Constitutional:       General: She is " not in acute distress.     Appearance: She is well-developed and normal weight. She is not ill-appearing or toxic-appearing.   HENT:      Head: Normocephalic and atraumatic.   Cardiovascular:      Rate and Rhythm: Normal rate and regular rhythm.      Pulses: Normal pulses.      Heart sounds: Normal heart sounds. No murmur heard.    No friction rub. No gallop.   Pulmonary:      Effort: Pulmonary effort is normal. No respiratory distress.      Breath sounds: Normal breath sounds. No wheezing, rhonchi or rales.   Musculoskeletal:      Right lower leg: No edema.      Left lower leg: No edema.   Skin:     General: Skin is warm and dry.   Neurological:      Mental Status: She is alert and oriented to person, place, and time.         Immunization History   Administered Date(s) Administered   • COVID-19 (PFIZER) Purple Cap Monovalent 04/16/2021, 05/07/2021, 01/24/2022   • FluLaval/Fluzone >6mos 11/18/2022   • PPD Test 10/26/2022   • Pneumococcal Polysaccharide (PPSV23) 11/18/2022   • Td 08/02/2021   • Td, Not Adsorbed 08/02/2021       Results Review:   -I personally viewed the patient's CT of the chest from 4/28/2023, there is no new nodules, masses, or infiltrates, there is atelectasis and scarring in the bases bilaterally unchanged compared to August 2022.   - CT scan of the chest from August 2022 showed bilateral infiltrates in the lower lobes, chest x-ray also consistent with this and low lung volumes.  Also reviewed the CT scan of the chest from May 2022 which also showed increased interstitial markings and infiltrates bilaterally, the CT scan of the chest in 2021 did not show this finding.  -I personally viewed the patient's PFTs from 5/1/2023 which showed severe restriction without obstruction and a severely reduced DLCO, FVC 41%, FEV1 39%, total lung capacity 46%, residual volume 43%, DLCO 40%.   - PFT from 9/6/2022 showed severe restriction without restriction and a severely reduced DLCO.  -I personally viewed the  patient's 6-minute walk test on 5/1/2023 she started percent on room air, dropped down to 88% with placed on 2 L pulsed dose and maintain on the 2 L pulsed dose.  She was able to walk 457 m which was 84% of predicted.  -pathology results from 9/15/2022, station 7 lymph node showed benign reactive lymph node, and right lower lobe transbronchial biopsy show obviated lung with mild chronic inflammation and focal fibrosis, negative for granulomas, atypia or malignancy.  -culture results from bronchoscopy on 9/15/2022, fungus culture, Aspergillus galactomannan, AFB, and respiratory culture were all negative.    Assessment / Plan:   Diagnoses and all orders for this visit:    1. NSIP (Primary)  2. Systemic lupus erythematosus with lung involvement, unspecified SLE type  3. Rheumatoid arthritis involving multiple sites, unspecified whether rheumatoid factor present  4. Sjogren's syndrome with lung involvement  -Patient's allergy appears to be due to her underlying autoimmune process.  She is already seeing rheumatology getting rituximab.  She also was treated recently for lupus flare.  I agree with the treatment for the lupus flare.  We reviewed the CT scan together, there is no obvious new disease.  She does have quite a bit of scarring.  PFTs are overall stable.  For now she should continue the steroid taper as prescribed by the ER in addition to that I want her to continue to follow with rheumatology.  From a pulmonary standpoint outside of steroids are very little but I have to offer currently.  We will continue to follow her PFTs, she continues to have Symbicort although I do not think it will make much of a difference.  She verbalized understanding this does not want any new medication at this point.    5. Chronic respiratory failure with hypoxia  -6-minute walk test performed today continue utilizing 2 L nasal cannula with exertion to enhance saturation greater than 88%    Level of service justified based on 37  minutes spent in patient care on this date of service including, but not limited to: preparing to see the patient, obtaining and/or reviewing history, performing medically appropriate examination, ordering tests/medicine/procedures, independently interpreting results, documenting clinical information in EHR, and counseling/education of patient/family/caregiver (Excluding time spent on other separate services such as performing procedures or test interpretation, if applicable). (Level 4 30-39 minutes; Level 5 40-54 minutes)    Follow Up:   Return in about 6 months (around 11/1/2023) for PFTs.       ALFA Dean DO  Pulmonary and Critical Care Medicine  Note Electronically Signed    Part of this note may be an electronic transcription/translation of spoken language to printed text using the Dragon Dictation System.

## 2023-06-05 ENCOUNTER — OFFICE VISIT (OUTPATIENT)
Dept: ONCOLOGY | Facility: CLINIC | Age: 58
End: 2023-06-05
Payer: COMMERCIAL

## 2023-06-05 ENCOUNTER — LAB (OUTPATIENT)
Dept: LAB | Facility: HOSPITAL | Age: 58
End: 2023-06-05
Payer: COMMERCIAL

## 2023-06-05 VITALS
WEIGHT: 158 LBS | HEIGHT: 66 IN | SYSTOLIC BLOOD PRESSURE: 123 MMHG | BODY MASS INDEX: 25.39 KG/M2 | HEART RATE: 102 BPM | DIASTOLIC BLOOD PRESSURE: 85 MMHG | TEMPERATURE: 97.3 F | OXYGEN SATURATION: 99 %

## 2023-06-05 DIAGNOSIS — D50.9 IRON DEFICIENCY ANEMIA, UNSPECIFIED IRON DEFICIENCY ANEMIA TYPE: ICD-10-CM

## 2023-06-05 DIAGNOSIS — K90.9 MALABSORPTION OF IRON: ICD-10-CM

## 2023-06-05 DIAGNOSIS — K90.9 MALABSORPTION OF IRON: Primary | ICD-10-CM

## 2023-06-05 LAB
BASOPHILS # BLD AUTO: 0.02 10*3/MM3 (ref 0–0.2)
BASOPHILS NFR BLD AUTO: 0.4 % (ref 0–1.5)
DEPRECATED RDW RBC AUTO: 53.8 FL (ref 37–54)
EOSINOPHIL # BLD AUTO: 0.03 10*3/MM3 (ref 0–0.4)
EOSINOPHIL NFR BLD AUTO: 0.6 % (ref 0.3–6.2)
ERYTHROCYTE [DISTWIDTH] IN BLOOD BY AUTOMATED COUNT: 15.7 % (ref 12.3–15.4)
HCT VFR BLD AUTO: 37.6 % (ref 34–46.6)
HGB BLD-MCNC: 12.2 G/DL (ref 12–15.9)
IMM GRANULOCYTES # BLD AUTO: 0.03 10*3/MM3 (ref 0–0.05)
IMM GRANULOCYTES NFR BLD AUTO: 0.6 % (ref 0–0.5)
LYMPHOCYTES # BLD AUTO: 0.46 10*3/MM3 (ref 0.7–3.1)
LYMPHOCYTES NFR BLD AUTO: 9.1 % (ref 19.6–45.3)
MCH RBC QN AUTO: 29.8 PG (ref 26.6–33)
MCHC RBC AUTO-ENTMCNC: 32.4 G/DL (ref 31.5–35.7)
MCV RBC AUTO: 91.9 FL (ref 79–97)
MONOCYTES # BLD AUTO: 0.46 10*3/MM3 (ref 0.1–0.9)
MONOCYTES NFR BLD AUTO: 9.1 % (ref 5–12)
NEUTROPHILS NFR BLD AUTO: 4.05 10*3/MM3 (ref 1.7–7)
NEUTROPHILS NFR BLD AUTO: 80.2 % (ref 42.7–76)
PLATELET # BLD AUTO: 266 10*3/MM3 (ref 140–450)
PMV BLD AUTO: 8.4 FL (ref 6–12)
RBC # BLD AUTO: 4.09 10*6/MM3 (ref 3.77–5.28)
WBC NRBC COR # BLD: 5.05 10*3/MM3 (ref 3.4–10.8)

## 2023-06-05 PROCEDURE — 36415 COLL VENOUS BLD VENIPUNCTURE: CPT

## 2023-06-05 PROCEDURE — 82728 ASSAY OF FERRITIN: CPT

## 2023-06-05 PROCEDURE — 84466 ASSAY OF TRANSFERRIN: CPT

## 2023-06-05 PROCEDURE — 85025 COMPLETE CBC W/AUTO DIFF WBC: CPT

## 2023-06-05 PROCEDURE — 83540 ASSAY OF IRON: CPT

## 2023-06-05 PROCEDURE — 99214 OFFICE O/P EST MOD 30 MIN: CPT | Performed by: INTERNAL MEDICINE

## 2023-06-05 RX ORDER — MYCOPHENOLATE MOFETIL 500 MG/1
TABLET ORAL
COMMUNITY
Start: 2023-05-18

## 2023-06-05 NOTE — PROGRESS NOTES
Follow Up Office Visit      Date: 2023     Patient Name: Shell Nayak  MRN: [id number]  : 1965  Referring Physician: John Greer     Chief Complaint:  Follow-up for anemia     History of Present Illness: Shell Nayak is a pleasant 55 y.o. female past medical history of hypothyroidism, fibromyalgia, Sojourn syndrome, rheumatoid arthritis who presents today for evaluation of neutropenia. The patient is accompanied by their aunt who contributes to the history of their care.  The patient has been followed by rheumatology for her multiple arthritic conditions.  She has been on several medications including most recently rituximab.  Per chart review, she has had multiple CBCs checked over the past year which have been notable for a mild leukopenia of around 2.5-3.  ANC has been stable at 1500.  She denies any recurrent fevers or infections.  Denies any unexplained weight loss.  Does note night sweats that happen 2-3 times per month.  Also notes significant fatigue which has been worsening over the past several months.  Denies any easy bleeding or bruising episodes.  Denies any family history of leukemia or lymphoma.  She is anxious about her visit today but otherwise compliant with other home medications with no other major concerns.     Interval History:  Presents clinic for follow-up.  Status post IV Venofer in 2022.  Continues to have stable energy levels.  Denies any dark tarry stools or bright red blood per rectum.  Denies any worsening weakness or fatigue       Oncology History:    Oncology/Hematology History    No history exists.       Subjective      Review of Systems:   Constitutional: Negative for fevers, chills, or weight loss  Eyes: Negative for blurred vision or discharge         Ear/Nose/Throat: Negative for difficulty swallowing, sore throat, LAD                                                       Respiratory: Negative for cough, SOA, wheezing                                                                                         Cardiovascular: Negative for chest pain or palpitations                                                                  Gastrointestinal: Negative for nausea, vomiting or diarrhea                                                                     Genitourinary: Negative for dysuria or hematuria                                                                                           Musculoskeletal: Negative for any joint pains or muscle aches                                                                        Neurologic: Negative for any weakness, headaches, dizziness                                                                         Hematologic: Negative for any easy bleeding or bruising                                                                                   Psychiatric: Negative for anxiety or depression                          Past Medical History/Past Surgical History/ Family History/ Social History: Reviewed by me and unchanged from my previous documentation done on March 2023.     Medications:     Current Outpatient Medications:     mycophenolate (CELLCEPT) 500 MG tablet, , Disp: , Rfl:     albuterol (PROVENTIL) (2.5 MG/3ML) 0.083% nebulizer solution, Take 2.5 mg by nebulization Every 4 (Four) Hours As Needed., Disp: , Rfl:     albuterol sulfate  (90 Base) MCG/ACT inhaler, Inhale 2 puffs Every 4 (Four) Hours As Needed., Disp: , Rfl:     B Complex-Biotin-FA (COMPLEX B-100 PO), Take 1 tablet by mouth Daily., Disp: , Rfl:     Cholecalciferol 125 MCG (5000 UT) tablet, Take 1 tablet by mouth Daily., Disp: , Rfl:     ferrous sulfate 325 (65 FE) MG tablet, Take 1 tablet by mouth Daily With Breakfast., Disp: 30 tablet, Rfl: 5    levothyroxine (SYNTHROID, LEVOTHROID) 112 MCG tablet, Take 1 tablet by mouth Daily., Disp: , Rfl:     levothyroxine (SYNTHROID, LEVOTHROID) 112 MCG tablet, Take 1 tablet by mouth., Disp: , Rfl:      Melatonin 10 MG tablet, Take 1 tablet by mouth., Disp: , Rfl:     pilocarpine (SALAGEN) 5 MG tablet, Take 1 tablet by mouth 3 (Three) Times a Day As Needed., Disp: , Rfl:     pilocarpine (SALAGEN) 5 MG tablet, Take 1 tablet by mouth Every 12 (Twelve) Hours., Disp: , Rfl:     predniSONE (DELTASONE) 10 MG tablet, Take 1 tablet by mouth Daily., Disp: , Rfl:     predniSONE (DELTASONE) 10 MG tablet, Take 1 tablet by mouth Daily. 6 tab PO daily for 3 days, then 5 tab PO daily for 3 days, then 4 tab PO daily for 3 days, then 3 tab PO daily for 3 days, then 2 tab PO daily x 3 days, then resume your usual dose of 1 tab PO daily., Disp: 60 tablet, Rfl: 0    pregabalin (LYRICA) 150 MG capsule, Take 1 capsule by mouth Every 12 (Twelve) Hours., Disp: , Rfl:     riTUXimab (Rituxan) 100 MG/10ML solution injection, Administer RITUXAN 1000mg IV week 0, 2, then every 4 months, Disp: , Rfl:     riTUXimab-abbs (Truxima) 100 MG/10ML solution injection, Administer TRUXIMA 1000mg IV, Disp: , Rfl:     riTUXimab-abbs (TRUXIMA) 500 MG/50ML solution injection, , Disp: , Rfl:     sertraline (ZOLOFT) 100 MG tablet, Take 1 tablet by mouth Daily., Disp: , Rfl:     sulfamethoxazole-trimethoprim (BACTRIM DS,SEPTRA DS) 800-160 MG per tablet, Take 1 tablet by mouth Daily., Disp: 90 tablet, Rfl: 3    Symbicort 160-4.5 MCG/ACT inhaler, Inhale 2 puffs 2 (Two) Times a Day., Disp: , Rfl:     traMADol (ULTRAM) 50 MG tablet, Take 1 tablet by mouth Every 6 (Six) Hours As Needed., Disp: , Rfl:     traMADol (ULTRAM) 50 MG tablet, Take 1 tablet by mouth Every 6 (Six) Hours., Disp: , Rfl:     Zinc 50 MG tablet, Take 1 tablet by mouth Daily., Disp: , Rfl:     Allergies:   Allergies   Allergen Reactions    Penicillins Rash     Hasn't had since childhood       Objective     Physical Exam:  Vital Signs:   Vitals:    06/05/23 1501   BP: 123/85   Pulse: 102   Temp: 97.3 °F (36.3 °C)   TempSrc: Infrared   SpO2: 99%   Weight: 71.7 kg (158 lb)   Height: 167.6 cm  "(66\")  Comment: per patient   PainSc:   4     Pain Score    06/05/23 1501   PainSc:   4     ECOG Performance Status: 1 - Symptomatic but completely ambulatory    Constitutional: NAD, ECOG 1  Eyes: PERRLA, scleral anicteric  ENT: No LAD, no thyromegaly  Respiratory: CTAB, no wheezing, rales, rhonchi  Cardiovascular: RRR, no murmurs, pulses 2+ bilaterally  Abdomen: soft, NT/ND, no HSM  Musculoskeletal: strength 5/5 bilaterally, no c/c/e  Neurologic: A&O x 3, CN II-XII intact grossly    Results Review:   No visits with results within 2 Week(s) from this visit.   Latest known visit with results is:   Admission on 04/28/2023, Discharged on 04/29/2023   Component Date Value Ref Range Status    QT Interval 04/28/2023 326  ms Final    QTC Interval 04/28/2023 443  ms Final    QT Interval 04/28/2023 368  ms Final    QTC Interval 04/28/2023 450  ms Final    HS Troponin T 04/28/2023 11 (H)  <10 ng/L Final    Glucose 04/28/2023 97  65 - 99 mg/dL Final    BUN 04/28/2023 9  6 - 20 mg/dL Final    Creatinine 04/28/2023 1.02 (H)  0.57 - 1.00 mg/dL Final    Sodium 04/28/2023 137  136 - 145 mmol/L Final    Potassium 04/28/2023 4.1  3.5 - 5.2 mmol/L Final    Chloride 04/28/2023 101  98 - 107 mmol/L Final    CO2 04/28/2023 24.0  22.0 - 29.0 mmol/L Final    Calcium 04/28/2023 8.9  8.6 - 10.5 mg/dL Final    Total Protein 04/28/2023 7.9  6.0 - 8.5 g/dL Final    Albumin 04/28/2023 3.4 (L)  3.5 - 5.2 g/dL Final    ALT (SGPT) 04/28/2023 <5  1 - 33 U/L Final    AST (SGOT) 04/28/2023 10  1 - 32 U/L Final    Alkaline Phosphatase 04/28/2023 76  39 - 117 U/L Final    Total Bilirubin 04/28/2023 0.4  0.0 - 1.2 mg/dL Final    Globulin 04/28/2023 4.5  gm/dL Final    Calculated Result    A/G Ratio 04/28/2023 0.8  g/dL Final    BUN/Creatinine Ratio 04/28/2023 8.8  7.0 - 25.0 Final    Anion Gap 04/28/2023 12.0  5.0 - 15.0 mmol/L Final    eGFR 04/28/2023 65.1  >60.0 mL/min/1.73 Final    Lipase 04/28/2023 33  13 - 60 U/L Final    proBNP 04/28/2023 784.9  " 0.0 - 900.0 pg/mL Final    Extra Tube 04/28/2023 Hold for add-ons.   Final    Auto resulted.    Extra Tube 04/28/2023 hold for add-on   Final    Auto resulted    Extra Tube 04/28/2023 Hold for add-ons.   Final    Auto resulted.    Extra Tube 04/28/2023 Hold for add-ons.   Final    Auto resulted.    Extra Tube 04/28/2023 Hold for add-ons.   Final    Auto resulted    WBC 04/28/2023 5.84  3.40 - 10.80 10*3/mm3 Final    RBC 04/28/2023 4.23  3.77 - 5.28 10*6/mm3 Final    Hemoglobin 04/28/2023 12.0  12.0 - 15.9 g/dL Final    Hematocrit 04/28/2023 37.8  34.0 - 46.6 % Final    MCV 04/28/2023 89.4  79.0 - 97.0 fL Final    MCH 04/28/2023 28.4  26.6 - 33.0 pg Final    MCHC 04/28/2023 31.7  31.5 - 35.7 g/dL Final    RDW 04/28/2023 13.9  12.3 - 15.4 % Final    RDW-SD 04/28/2023 45.3  37.0 - 54.0 fl Final    MPV 04/28/2023 8.8  6.0 - 12.0 fL Final    Platelets 04/28/2023 340  140 - 450 10*3/mm3 Final    Neutrophil % 04/28/2023 92.8 (H)  42.7 - 76.0 % Final    Lymphocyte % 04/28/2023 3.1 (L)  19.6 - 45.3 % Final    Monocyte % 04/28/2023 3.6 (L)  5.0 - 12.0 % Final    Eosinophil % 04/28/2023 0.0 (L)  0.3 - 6.2 % Final    Basophil % 04/28/2023 0.2  0.0 - 1.5 % Final    Immature Grans % 04/28/2023 0.3  0.0 - 0.5 % Final    Neutrophils, Absolute 04/28/2023 5.42  1.70 - 7.00 10*3/mm3 Final    Lymphocytes, Absolute 04/28/2023 0.18 (L)  0.70 - 3.10 10*3/mm3 Final    Monocytes, Absolute 04/28/2023 0.21  0.10 - 0.90 10*3/mm3 Final    Eosinophils, Absolute 04/28/2023 0.00  0.00 - 0.40 10*3/mm3 Final    Basophils, Absolute 04/28/2023 0.01  0.00 - 0.20 10*3/mm3 Final    Immature Grans, Absolute 04/28/2023 0.02  0.00 - 0.05 10*3/mm3 Final    nRBC 04/28/2023 0.0  0.0 - 0.2 /100 WBC Final    ADENOVIRUS, PCR 04/28/2023 Not Detected  Not Detected Final    Coronavirus 229E 04/28/2023 Not Detected  Not Detected Final    Coronavirus HKU1 04/28/2023 Not Detected  Not Detected Final    Coronavirus NL63 04/28/2023 Not Detected  Not Detected Final     Coronavirus OC43 04/28/2023 Not Detected  Not Detected Final    COVID19 04/28/2023 Not Detected  Not Detected - Ref. Range Final    Human Metapneumovirus 04/28/2023 Not Detected  Not Detected Final    Human Rhinovirus/Enterovirus 04/28/2023 Not Detected  Not Detected Final    Influenza A PCR 04/28/2023 Not Detected  Not Detected Final    Influenza B PCR 04/28/2023 Not Detected  Not Detected Final    Parainfluenza Virus 1 04/28/2023 Not Detected  Not Detected Final    Parainfluenza Virus 2 04/28/2023 Not Detected  Not Detected Final    Parainfluenza Virus 3 04/28/2023 Not Detected  Not Detected Final    Parainfluenza Virus 4 04/28/2023 Not Detected  Not Detected Final    RSV, PCR 04/28/2023 Not Detected  Not Detected Final    Bordetella pertussis pcr 04/28/2023 Not Detected  Not Detected Final    Bordetella parapertussis PCR 04/28/2023 Not Detected  Not Detected Final    Chlamydophila pneumoniae PCR 04/28/2023 Not Detected  Not Detected Final    Mycoplasma pneumo by PCR 04/28/2023 Not Detected  Not Detected Final    D-Dimer, Quantitative 04/28/2023 1.37 (H)  0.00 - 0.55 MCGFEU/mL Final    HS Troponin T 04/28/2023 9  <10 ng/L Final    Troponin T Delta 04/28/2023 -2  >=-4 - <+4 ng/L Final       No results found.    Assessment / Plan      Assessment/Plan:   1. Malabsorption of iron (Primary)/2. Iron deficiency anemia, unspecified iron deficiency anemia type  -Hemoglobin 7.8 with her rheumatologist in November 2022  -Iron studies consistent with iron deficiency anemia  -Initially started on oral iron but intolerant secondary to GI upset  -Status post IV Venofer in December 2022  -Repeat hemoglobin 10.8 in March 2023.  Iron studies overall stable  -Repeat hemoglobin and iron studies pending today  -Patient has been seen by Dr. Cason with plans for EGD/colonoscopy in the near future     2.  Sojourn syndrome/rheumatoid arthritis/fibromyalgia  -Followed by Dr. Greer with rheumatology  -Currently getting rituximab  infusions      3. Other neutropenia (CMS/HCC) (Primary)  -Unclear etiology at this time.  Likely secondary to patient's underlying rheumatologic conditions as well as medication  -Per chart review WBC between 2.5-3 with ANC at 1500  -Patient currently asymptomatic with no significant constitutional symptoms except for fatigue  -Repeat CBC in May 2021 with a white count of 2.4 and an ANC of 1.49  -Iron studies, LDH, vitamin B12, folate, copper within normal limits  -HIV negative  -Peripheral smear with no immature cells or blast  -Peripheral flow showing a 20% population of monocytes which are nonspecific and could be reactive given her chronic history of arthritis  -Repeat CBC in December 2021 showing a WBC of 3.9 and an ANC of 3.1  -No further hematologic work-up required     4.  Dyspnea  -Following with pulmonology  -Stable today on 3 L         Follow Up:   Follow-up in 6 months or sooner if needed       Lyle Metcalf MD  Hematology and Oncology     Please note that portions of this note may have been completed with a voice recognition program. Efforts were made to edit the dictations, but occasionally words are mistranscribed.

## 2023-06-06 LAB
FERRITIN SERPL-MCNC: 636 NG/ML (ref 13–150)
IRON 24H UR-MRATE: 68 MCG/DL (ref 37–145)
IRON SATN MFR SERPL: 24 % (ref 20–50)
TIBC SERPL-MCNC: 283 MCG/DL (ref 298–536)
TRANSFERRIN SERPL-MCNC: 190 MG/DL (ref 200–360)

## 2023-11-20 PROBLEM — J96.11 CHRONIC RESPIRATORY FAILURE WITH HYPOXIA: Status: ACTIVE | Noted: 2023-11-20

## 2023-11-20 PROBLEM — M06.9 RHEUMATOID ARTHRITIS INVOLVING MULTIPLE SITES: Status: ACTIVE | Noted: 2023-11-20

## 2023-11-20 PROBLEM — M32.13 SYSTEMIC LUPUS ERYTHEMATOSUS WITH LUNG INVOLVEMENT: Status: ACTIVE | Noted: 2023-11-20

## 2023-11-20 PROBLEM — M35.02 SJOGREN'S SYNDROME WITH LUNG INVOLVEMENT: Status: ACTIVE | Noted: 2023-11-20

## 2023-12-07 ENCOUNTER — OFFICE VISIT (OUTPATIENT)
Dept: PULMONOLOGY | Facility: CLINIC | Age: 58
End: 2023-12-07
Payer: COMMERCIAL

## 2023-12-07 VITALS
TEMPERATURE: 97.5 F | WEIGHT: 158.9 LBS | BODY MASS INDEX: 25.54 KG/M2 | OXYGEN SATURATION: 100 % | DIASTOLIC BLOOD PRESSURE: 64 MMHG | SYSTOLIC BLOOD PRESSURE: 112 MMHG | HEART RATE: 91 BPM | HEIGHT: 66 IN

## 2023-12-07 DIAGNOSIS — M06.9 RHEUMATOID ARTHRITIS INVOLVING MULTIPLE SITES, UNSPECIFIED WHETHER RHEUMATOID FACTOR PRESENT: ICD-10-CM

## 2023-12-07 DIAGNOSIS — J84.9 ILD (INTERSTITIAL LUNG DISEASE): Primary | ICD-10-CM

## 2023-12-07 DIAGNOSIS — M32.13 SYSTEMIC LUPUS ERYTHEMATOSUS WITH LUNG INVOLVEMENT, UNSPECIFIED SLE TYPE: ICD-10-CM

## 2023-12-07 DIAGNOSIS — J96.11 CHRONIC RESPIRATORY FAILURE WITH HYPOXIA: ICD-10-CM

## 2023-12-07 RX ORDER — PREDNISONE 5 MG/1
5 TABLET ORAL DAILY
COMMUNITY

## 2023-12-07 RX ORDER — RITUXIMAB-PVVR 100 MG/10ML
INJECTION, SOLUTION INTRAVENOUS
COMMUNITY
Start: 2023-11-29

## 2023-12-07 RX ORDER — PREGABALIN 225 MG/1
225 CAPSULE ORAL 2 TIMES DAILY
COMMUNITY

## 2023-12-07 NOTE — PROGRESS NOTES
"Follow Up Office Note       Patient Name: Shell Nayak    Referring Physician: No ref. provider found    Chief Complaint:    Chief Complaint   Patient presents with    NSIP     Follow up       History of Present Illness: Shell Nayak is a 58 y.o. female who is here today to follow-up care with Pulmonary.   Patient's past medical history significant for hypothyroidism, Sjogren's syndrome, lupus, rheumatoid arthritis, and fibromyalgia.   Patient is currently doing very well.  She is off of her oxygen at least during the daytime.  Still uses at night.  Denies any coughing.  No significant shortness of breath.  She is now on CellCept, rituximab, and 5 mg prednisone daily.  This seems to have significantly improved overall her symptoms.  No other acute complaints currently.    Review of Systems:   Review of Systems   Constitutional:  Negative for chills, fatigue and fever.   HENT:  Negative for congestion and voice change.    Eyes:  Negative for blurred vision.   Respiratory:  Negative for cough, shortness of breath and wheezing.    Cardiovascular:  Negative for chest pain.   Skin:  Negative for dry skin.   Hematological:  Negative for adenopathy.   Psychiatric/Behavioral:  Negative for agitation and depressed mood.        The following portions of the patient's history were reviewed and updated as appropriate: allergies, current medications, past family history, past medical history, past social history, past surgical history and problem list.    Physical Exam:  Vital Signs:   Vitals:    12/07/23 0929   BP: 112/64   BP Location: Left arm   Patient Position: Sitting   Cuff Size: Adult   Pulse: 91   Temp: 97.5 °F (36.4 °C)   SpO2: 100%  Comment: Room air at rest   Weight: 72.1 kg (158 lb 14.4 oz)   Height: 167.6 cm (66\")       Physical Exam  Vitals and nursing note reviewed.   Constitutional:       General: She is not in acute distress.     Appearance: She is well-developed and normal weight. She is " not ill-appearing or toxic-appearing.   HENT:      Head: Normocephalic and atraumatic.   Cardiovascular:      Rate and Rhythm: Normal rate and regular rhythm.      Pulses: Normal pulses.      Heart sounds: Normal heart sounds. No murmur heard.     No friction rub. No gallop.   Pulmonary:      Effort: Pulmonary effort is normal. No respiratory distress.      Breath sounds: Normal breath sounds. No wheezing, rhonchi or rales.   Musculoskeletal:      Right lower leg: No edema.      Left lower leg: No edema.   Skin:     General: Skin is warm and dry.   Neurological:      Mental Status: She is alert and oriented to person, place, and time.         Immunization History   Administered Date(s) Administered    COVID-19 (PFIZER) Purple Cap Monovalent 04/16/2021, 05/07/2021, 01/24/2022    Fluzone (or Fluarix & Flulaval for VFC) >6mos 11/18/2022, 09/15/2023    PPD Test 10/26/2022    Pneumococcal Polysaccharide (PPSV23) 11/18/2022    Td (TDVAX) 08/02/2021    Td, Not Adsorbed 08/02/2021       Results Review:   - CT of the chest from 4/28/2023, there is no new nodules, masses, or infiltrates, there is atelectasis and scarring in the bases bilaterally unchanged compared to August 2022.              - CT scan of the chest from August 2022 showed bilateral infiltrates in the lower lobes, chest x-ray also consistent with this and low lung volumes.  Also reviewed the CT scan of the chest from May 2022 which also showed increased interstitial markings and infiltrates bilaterally, the CT scan of the chest in 2021 did not show this finding.  -I personally viewed the patient's PFTs from 12/7/2023 which showed moderate restriction without obstruction and a severely reduced DLCO, FEV1 now 55%, total lung capacity 62%, FVC 57%, and DLCO 44%.  - PFTs from 5/1/2023 which showed severe restriction without obstruction and a severely reduced DLCO, FVC 41%, FEV1 39%, total lung capacity 46%, residual volume 43%, DLCO 40%.              - PFT from  9/6/2022 showed severe restriction without restriction and a severely reduced DLCO.  - 6-minute walk test on 5/1/2023 she started percent on room air, dropped down to 88% with placed on 2 L pulsed dose and maintain on the 2 L pulsed dose.  She was able to walk 457 m which was 84% of predicted.  -pathology results from 9/15/2022, station 7 lymph node showed benign reactive lymph node, and right lower lobe transbronchial biopsy show obviated lung with mild chronic inflammation and focal fibrosis, negative for granulomas, atypia or malignancy.  -culture results from bronchoscopy on 9/15/2022, fungus culture, Aspergillus galactomannan, AFB, and respiratory culture were all negative.    Assessment / Plan:   Diagnoses and all orders for this visit:    1. NSIP (Primary)  2. Chronic respiratory failure with hypoxia  3. Systemic lupus erythematosus with lung involvement, unspecified SLE type  4. Rheumatoid arthritis involving multiple sites, unspecified whether rheumatoid factor present  -Patient is now significantly better than what she was when I originally started seeing her.  Her lung function has drastically improved.  She is no longer requiring oxygen.  I still want her to utilize the oxygen at night as she has during the day to check if she is having any issues.  I wanted to continue on the rituximab, CellCept, and prednisone continue to follow with rheumatology.  Will continue to follow her at least every 6 months with repeat PFTs on each visit to look for any signs of worsening lung function.  I explained her that if at this point she will start developing progressive lung disease I would start her on Ofev.  She verbalized understanding this and agree with the plan.      Follow Up:   Return in about 6 months (around 6/7/2024) for PFTs.       ALFA Dean, DO  Pulmonary and Critical Care Medicine  Note Electronically Signed    Part of this note may be an electronic transcription/translation of spoken language to  printed text using the Dragon Dictation System.

## 2023-12-08 DIAGNOSIS — K90.9 MALABSORPTION OF IRON: Primary | ICD-10-CM

## 2023-12-11 ENCOUNTER — LAB (OUTPATIENT)
Dept: LAB | Facility: HOSPITAL | Age: 58
End: 2023-12-11
Payer: COMMERCIAL

## 2023-12-11 ENCOUNTER — OFFICE VISIT (OUTPATIENT)
Dept: ONCOLOGY | Facility: CLINIC | Age: 58
End: 2023-12-11
Payer: COMMERCIAL

## 2023-12-11 VITALS
WEIGHT: 156 LBS | OXYGEN SATURATION: 99 % | TEMPERATURE: 98.7 F | SYSTOLIC BLOOD PRESSURE: 117 MMHG | HEIGHT: 66 IN | DIASTOLIC BLOOD PRESSURE: 81 MMHG | BODY MASS INDEX: 25.07 KG/M2 | HEART RATE: 83 BPM

## 2023-12-11 DIAGNOSIS — D50.9 IRON DEFICIENCY ANEMIA, UNSPECIFIED IRON DEFICIENCY ANEMIA TYPE: Primary | ICD-10-CM

## 2023-12-11 DIAGNOSIS — K90.9 MALABSORPTION OF IRON: ICD-10-CM

## 2023-12-11 LAB
BASOPHILS # BLD AUTO: 0.01 10*3/MM3 (ref 0–0.2)
BASOPHILS NFR BLD AUTO: 0.3 % (ref 0–1.5)
DEPRECATED RDW RBC AUTO: 44.5 FL (ref 37–54)
EOSINOPHIL # BLD AUTO: 0 10*3/MM3 (ref 0–0.4)
EOSINOPHIL NFR BLD AUTO: 0 % (ref 0.3–6.2)
ERYTHROCYTE [DISTWIDTH] IN BLOOD BY AUTOMATED COUNT: 13.1 % (ref 12.3–15.4)
FERRITIN SERPL-MCNC: 403.7 NG/ML (ref 13–150)
HCT VFR BLD AUTO: 40.4 % (ref 34–46.6)
HGB BLD-MCNC: 13.4 G/DL (ref 12–15.9)
IMM GRANULOCYTES # BLD AUTO: 0 10*3/MM3 (ref 0–0.05)
IMM GRANULOCYTES NFR BLD AUTO: 0 % (ref 0–0.5)
IRON 24H UR-MRATE: 47 MCG/DL (ref 37–145)
IRON SATN MFR SERPL: 16 % (ref 20–50)
LYMPHOCYTES # BLD AUTO: 0.27 10*3/MM3 (ref 0.7–3.1)
LYMPHOCYTES NFR BLD AUTO: 7.3 % (ref 19.6–45.3)
MCH RBC QN AUTO: 30.6 PG (ref 26.6–33)
MCHC RBC AUTO-ENTMCNC: 33.2 G/DL (ref 31.5–35.7)
MCV RBC AUTO: 92.2 FL (ref 79–97)
MONOCYTES # BLD AUTO: 0.24 10*3/MM3 (ref 0.1–0.9)
MONOCYTES NFR BLD AUTO: 6.5 % (ref 5–12)
NEUTROPHILS NFR BLD AUTO: 3.18 10*3/MM3 (ref 1.7–7)
NEUTROPHILS NFR BLD AUTO: 85.9 % (ref 42.7–76)
PLATELET # BLD AUTO: 170 10*3/MM3 (ref 140–450)
PMV BLD AUTO: 10.4 FL (ref 6–12)
RBC # BLD AUTO: 4.38 10*6/MM3 (ref 3.77–5.28)
TIBC SERPL-MCNC: 302 MCG/DL (ref 298–536)
TRANSFERRIN SERPL-MCNC: 203 MG/DL (ref 200–360)
WBC NRBC COR # BLD AUTO: 3.7 10*3/MM3 (ref 3.4–10.8)

## 2023-12-11 PROCEDURE — 84466 ASSAY OF TRANSFERRIN: CPT

## 2023-12-11 PROCEDURE — 36415 COLL VENOUS BLD VENIPUNCTURE: CPT

## 2023-12-11 PROCEDURE — 85025 COMPLETE CBC W/AUTO DIFF WBC: CPT

## 2023-12-11 PROCEDURE — 83540 ASSAY OF IRON: CPT

## 2023-12-11 PROCEDURE — 82728 ASSAY OF FERRITIN: CPT

## 2023-12-11 PROCEDURE — 99214 OFFICE O/P EST MOD 30 MIN: CPT | Performed by: INTERNAL MEDICINE

## 2023-12-11 NOTE — PROGRESS NOTES
Follow Up Office Visit      Date: 2023     Patient Name: Shell Nayak  MRN: [id number]  : 1965  Referring Physician: John Greer     Chief Complaint:  Follow-up for anemia     History of Present Illness: Shell Nayak is a pleasant 55 y.o. female past medical history of hypothyroidism, fibromyalgia, Sojourn syndrome, rheumatoid arthritis who presents today for evaluation of neutropenia. The patient is accompanied by their aunt who contributes to the history of their care.  The patient has been followed by rheumatology for her multiple arthritic conditions.  She has been on several medications including most recently rituximab.  Per chart review, she has had multiple CBCs checked over the past year which have been notable for a mild leukopenia of around 2.5-3.  ANC has been stable at 1500.  She denies any recurrent fevers or infections.  Denies any unexplained weight loss.  Does note night sweats that happen 2-3 times per month.  Also notes significant fatigue which has been worsening over the past several months.  Denies any easy bleeding or bruising episodes.  Denies any family history of leukemia or lymphoma.  She is anxious about her visit today but otherwise compliant with other home medications with no other major concerns.     Interval History:  Presents clinic for follow-up.  Status post IV Venofer in 2022.  Energy levels remain good.  Notes that her breathing has significantly improved.  No longer requiring oxygen during the day    Oncology History:    Oncology/Hematology History    No history exists.       Subjective      Review of Systems:   Constitutional: Negative for fevers, chills, or weight loss  Eyes: Negative for blurred vision or discharge         Ear/Nose/Throat: Negative for difficulty swallowing, sore throat, LAD                                                       Respiratory: Negative for cough, SOA, wheezing                                                                                         Cardiovascular: Negative for chest pain or palpitations                                                                  Gastrointestinal: Negative for nausea, vomiting or diarrhea                                                                     Genitourinary: Negative for dysuria or hematuria                                                                                           Musculoskeletal: Negative for any joint pains or muscle aches                                                                        Neurologic: Negative for any weakness, headaches, dizziness                                                                         Hematologic: Negative for any easy bleeding or bruising                                                                                   Psychiatric: Negative for anxiety or depression                          Past Medical History/Past Surgical History/ Family History/ Social History: Reviewed by me and unchanged from my previous documentation done on June 2023.     Medications:     Current Outpatient Medications:     albuterol (PROVENTIL) (2.5 MG/3ML) 0.083% nebulizer solution, Take 2.5 mg by nebulization Every 4 (Four) Hours As Needed., Disp: , Rfl:     albuterol sulfate  (90 Base) MCG/ACT inhaler, Inhale 2 puffs Every 4 (Four) Hours As Needed., Disp: , Rfl:     B Complex-Biotin-FA (COMPLEX B-100 PO), Take 1 tablet by mouth Daily., Disp: , Rfl:     Cholecalciferol 125 MCG (5000 UT) tablet, Take 1 tablet by mouth Daily., Disp: , Rfl:     ferrous sulfate 325 (65 FE) MG tablet, Take 1 tablet by mouth Daily With Breakfast., Disp: 30 tablet, Rfl: 5    levothyroxine (SYNTHROID, LEVOTHROID) 112 MCG tablet, Take 1 tablet by mouth., Disp: , Rfl:     Melatonin 10 MG tablet, Take 1 tablet by mouth., Disp: , Rfl:     mycophenolate (CELLCEPT) 500 MG tablet, , Disp: , Rfl:     pilocarpine (SALAGEN) 5 MG tablet, Take 1 tablet by mouth 3  "(Three) Times a Day As Needed., Disp: , Rfl:     predniSONE (DELTASONE) 5 MG tablet, Take 1 tablet by mouth Daily., Disp: , Rfl:     pregabalin (Lyrica) 225 MG capsule, Take 1 capsule by mouth 2 (Two) Times a Day., Disp: , Rfl:     riTUXimab-pvvr (Ruxience) 100 MG/10ML solution injection, Administer RUXIENCE 1000 every 4 months, Disp: , Rfl:     sertraline (ZOLOFT) 100 MG tablet, Take 1 tablet by mouth Daily., Disp: , Rfl:     Symbicort 160-4.5 MCG/ACT inhaler, Inhale 2 puffs 2 (Two) Times a Day., Disp: , Rfl:     traMADol (ULTRAM) 50 MG tablet, Take 1 tablet by mouth Every 6 (Six) Hours As Needed., Disp: , Rfl:     Zinc 50 MG tablet, Take 1 tablet by mouth Daily., Disp: , Rfl:     Allergies:   Allergies   Allergen Reactions    Penicillins Rash     Hasn't had since childhood       Objective     Physical Exam:  Vital Signs:   Vitals:    12/11/23 1442   BP: 117/81   Pulse: 83   Temp: 98.7 °F (37.1 °C)   TempSrc: Temporal   SpO2: 99%   Weight: 70.8 kg (156 lb)   Height: 167.6 cm (65.98\")   PainSc: 0-No pain     Pain Score    12/11/23 1442   PainSc: 0-No pain     ECOG Performance Status: 0 - Asymptomatic    Constitutional: NAD, ECOG 0  Eyes: PERRLA, scleral anicteric  ENT: No LAD, no thyromegaly  Respiratory: CTAB, no wheezing, rales, rhonchi  Cardiovascular: RRR, no murmurs, pulses 2+ bilaterally  Abdomen: soft, NT/ND, no HSM  Musculoskeletal: strength 5/5 bilaterally, no c/c/e  Neurologic: A&O x 3, CN II-XII intact grossly    Results Review:   Lab on 12/11/2023   Component Date Value Ref Range Status    WBC 12/11/2023 3.70  3.40 - 10.80 10*3/mm3 Final    RBC 12/11/2023 4.38  3.77 - 5.28 10*6/mm3 Final    Hemoglobin 12/11/2023 13.4  12.0 - 15.9 g/dL Final    Hematocrit 12/11/2023 40.4  34.0 - 46.6 % Final    MCV 12/11/2023 92.2  79.0 - 97.0 fL Final    MCH 12/11/2023 30.6  26.6 - 33.0 pg Final    MCHC 12/11/2023 33.2  31.5 - 35.7 g/dL Final    RDW 12/11/2023 13.1  12.3 - 15.4 % Final    RDW-SD 12/11/2023 44.5  37.0 - " 54.0 fl Final    MPV 12/11/2023 10.4  6.0 - 12.0 fL Final    Platelets 12/11/2023 170  140 - 450 10*3/mm3 Final    Neutrophil % 12/11/2023 85.9 (H)  42.7 - 76.0 % Final    Lymphocyte % 12/11/2023 7.3 (L)  19.6 - 45.3 % Final    Monocyte % 12/11/2023 6.5  5.0 - 12.0 % Final    Eosinophil % 12/11/2023 0.0 (L)  0.3 - 6.2 % Final    Basophil % 12/11/2023 0.3  0.0 - 1.5 % Final    Immature Grans % 12/11/2023 0.0  0.0 - 0.5 % Final    Neutrophils, Absolute 12/11/2023 3.18  1.70 - 7.00 10*3/mm3 Final    Lymphocytes, Absolute 12/11/2023 0.27 (L)  0.70 - 3.10 10*3/mm3 Final    Monocytes, Absolute 12/11/2023 0.24  0.10 - 0.90 10*3/mm3 Final    Eosinophils, Absolute 12/11/2023 0.00  0.00 - 0.40 10*3/mm3 Final    Basophils, Absolute 12/11/2023 0.01  0.00 - 0.20 10*3/mm3 Final    Immature Grans, Absolute 12/11/2023 0.00  0.00 - 0.05 10*3/mm3 Final       No results found.    Assessment / Plan      Assessment/Plan:   1. Malabsorption of iron (Primary)/2. Iron deficiency anemia, unspecified iron deficiency anemia type  -Hemoglobin 7.8 with her rheumatologist in November 2022  -Iron studies consistent with iron deficiency anemia  -Initially started on oral iron but intolerant secondary to GI upset  -Status post IV Venofer in December 2022  -Repeat hemoglobin 10.8 in March 2023.  Iron studies overall stable  -Hemoglobin 13.4 in December 2023.  Iron studies pending  -No further IV iron required at this time.  Can follow-up in the clinic as needed  -Saw Dr. Cason in February 2023.  Did not end up having a colonoscopy at that time.      2.  Sojourn syndrome/rheumatoid arthritis/fibromyalgia  -Followed by Dr. Greer with rheumatology  -Currently getting rituximab infusions      3. Other neutropenia (CMS/HCC) (Primary)  -Unclear etiology at this time.  Likely secondary to patient's underlying rheumatologic conditions as well as medication  -Per chart review WBC between 2.5-3 with ANC at 1500  -Patient currently asymptomatic with no  significant constitutional symptoms except for fatigue  -Repeat CBC in May 2021 with a white count of 2.4 and an ANC of 1.49  -Iron studies, LDH, vitamin B12, folate, copper within normal limits  -HIV negative  -Peripheral smear with no immature cells or blast  -Peripheral flow showing a 20% population of monocytes which are nonspecific and could be reactive given her chronic history of arthritis  -Repeat CBC in December 2021 showing a WBC of 3.9 and an ANC of 3.1  -No further hematologic work-up required     4.  Dyspnea  -Following with pulmonology  -Significantly improved and now off oxygen during the day         Follow Up:   Follow-up as needed     Lyle Metcalf MD  Hematology and Oncology     Please note that portions of this note may have been completed with a voice recognition program. Efforts were made to edit the dictations, but occasionally words are mistranscribed.

## 2024-06-20 PROBLEM — J98.4 CHRONIC LUNG DISEASE: Status: ACTIVE | Noted: 2024-06-20

## 2024-06-24 ENCOUNTER — LAB (OUTPATIENT)
Facility: HOSPITAL | Age: 59
End: 2024-06-24
Payer: COMMERCIAL

## 2024-06-24 ENCOUNTER — OFFICE VISIT (OUTPATIENT)
Age: 59
End: 2024-06-24
Payer: COMMERCIAL

## 2024-06-24 VITALS
WEIGHT: 168 LBS | BODY MASS INDEX: 27 KG/M2 | SYSTOLIC BLOOD PRESSURE: 122 MMHG | HEART RATE: 73 BPM | TEMPERATURE: 98.2 F | DIASTOLIC BLOOD PRESSURE: 90 MMHG | HEIGHT: 66 IN

## 2024-06-24 DIAGNOSIS — J84.89 NSIP (NONSPECIFIC INTERSTITIAL PNEUMONIA): ICD-10-CM

## 2024-06-24 DIAGNOSIS — Z79.899 HIGH RISK MEDICATION USE: Chronic | ICD-10-CM

## 2024-06-24 DIAGNOSIS — Z79.899 IMMUNODEFICIENCY DUE TO DRUG THERAPY: Chronic | ICD-10-CM

## 2024-06-24 DIAGNOSIS — M05.9 SEROPOSITIVE RHEUMATOID ARTHRITIS: ICD-10-CM

## 2024-06-24 DIAGNOSIS — Z51.81 ENCOUNTER FOR THERAPEUTIC DRUG MONITORING: ICD-10-CM

## 2024-06-24 DIAGNOSIS — Z79.899 HIGH RISK MEDICATION USE: ICD-10-CM

## 2024-06-24 DIAGNOSIS — Z79.52 CURRENT USE OF STEROID MEDICATION: Chronic | ICD-10-CM

## 2024-06-24 DIAGNOSIS — D84.821 IMMUNODEFICIENCY DUE TO DRUG THERAPY: Chronic | ICD-10-CM

## 2024-06-24 DIAGNOSIS — M79.7 FIBROMYALGIA: ICD-10-CM

## 2024-06-24 DIAGNOSIS — M32.9 SYSTEMIC LUPUS ERYTHEMATOSUS, UNSPECIFIED SLE TYPE, UNSPECIFIED ORGAN INVOLVEMENT STATUS: ICD-10-CM

## 2024-06-24 DIAGNOSIS — Z79.899 IMMUNODEFICIENCY DUE TO DRUG THERAPY: ICD-10-CM

## 2024-06-24 DIAGNOSIS — M35.01 SJOGREN'S SYNDROME WITH KERATOCONJUNCTIVITIS SICCA: ICD-10-CM

## 2024-06-24 DIAGNOSIS — M79.7 FIBROMYALGIA: Chronic | ICD-10-CM

## 2024-06-24 DIAGNOSIS — D84.821 IMMUNODEFICIENCY DUE TO DRUG THERAPY: ICD-10-CM

## 2024-06-24 DIAGNOSIS — J84.89 NSIP (NONSPECIFIC INTERSTITIAL PNEUMONIA): Chronic | ICD-10-CM

## 2024-06-24 DIAGNOSIS — Z79.52 CURRENT USE OF STEROID MEDICATION: ICD-10-CM

## 2024-06-24 DIAGNOSIS — D72.819 LEUKOPENIA, UNSPECIFIED TYPE: Chronic | ICD-10-CM

## 2024-06-24 DIAGNOSIS — M32.9 SYSTEMIC LUPUS ERYTHEMATOSUS, UNSPECIFIED SLE TYPE, UNSPECIFIED ORGAN INVOLVEMENT STATUS: Primary | Chronic | ICD-10-CM

## 2024-06-24 DIAGNOSIS — M05.9 SEROPOSITIVE RHEUMATOID ARTHRITIS: Chronic | ICD-10-CM

## 2024-06-24 DIAGNOSIS — M35.01 SJOGREN'S SYNDROME WITH KERATOCONJUNCTIVITIS SICCA: Chronic | ICD-10-CM

## 2024-06-24 DIAGNOSIS — D72.819 LEUKOPENIA, UNSPECIFIED TYPE: ICD-10-CM

## 2024-06-24 DIAGNOSIS — Z51.81 ENCOUNTER FOR THERAPEUTIC DRUG MONITORING: Chronic | ICD-10-CM

## 2024-06-24 LAB
ALBUMIN SERPL-MCNC: 4.3 G/DL (ref 3.5–5.2)
ALBUMIN/GLOB SERPL: 1.4 G/DL
ALP SERPL-CCNC: 76 U/L (ref 39–117)
ALT SERPL W P-5'-P-CCNC: 11 U/L (ref 1–33)
AMPHET+METHAMPHET UR QL: NEGATIVE
AMPHETAMINES UR QL: NEGATIVE
ANION GAP SERPL CALCULATED.3IONS-SCNC: 12.2 MMOL/L (ref 5–15)
AST SERPL-CCNC: 20 U/L (ref 1–32)
BARBITURATES UR QL SCN: NEGATIVE
BASOPHILS # BLD AUTO: 0.02 10*3/MM3 (ref 0–0.2)
BASOPHILS NFR BLD AUTO: 0.4 % (ref 0–1.5)
BENZODIAZ UR QL SCN: NEGATIVE
BILIRUB SERPL-MCNC: 0.3 MG/DL (ref 0–1.2)
BILIRUB UR QL STRIP: NEGATIVE
BUN SERPL-MCNC: 23 MG/DL (ref 6–20)
BUN/CREAT SERPL: 22.3 (ref 7–25)
BUPRENORPHINE SERPL-MCNC: NEGATIVE NG/ML
C3 SERPL-MCNC: 105 MG/DL (ref 82–167)
C4 SERPL-MCNC: 20 MG/DL (ref 14–44)
CALCIUM SPEC-SCNC: 9.5 MG/DL (ref 8.6–10.5)
CANNABINOIDS SERPL QL: NEGATIVE
CHLORIDE SERPL-SCNC: 105 MMOL/L (ref 98–107)
CK SERPL-CCNC: 43 U/L (ref 20–180)
CLARITY UR: CLEAR
CO2 SERPL-SCNC: 23.8 MMOL/L (ref 22–29)
COCAINE UR QL: NEGATIVE
COLOR UR: YELLOW
CREAT SERPL-MCNC: 1.03 MG/DL (ref 0.57–1)
CRP SERPL-MCNC: <0.3 MG/DL (ref 0–0.5)
DEPRECATED RDW RBC AUTO: 46.2 FL (ref 37–54)
EGFRCR SERPLBLD CKD-EPI 2021: 63.9 ML/MIN/1.73
EOSINOPHIL # BLD AUTO: 0.04 10*3/MM3 (ref 0–0.4)
EOSINOPHIL NFR BLD AUTO: 0.9 % (ref 0.3–6.2)
ERYTHROCYTE [DISTWIDTH] IN BLOOD BY AUTOMATED COUNT: 13.3 % (ref 12.3–15.4)
ERYTHROCYTE [SEDIMENTATION RATE] IN BLOOD: 31 MM/HR (ref 0–30)
FENTANYL UR-MCNC: NEGATIVE NG/ML
GLOBULIN UR ELPH-MCNC: 3.1 GM/DL
GLUCOSE SERPL-MCNC: 63 MG/DL (ref 65–99)
GLUCOSE UR STRIP-MCNC: NEGATIVE MG/DL
HCT VFR BLD AUTO: 40.7 % (ref 34–46.6)
HGB BLD-MCNC: 13.1 G/DL (ref 12–15.9)
HGB UR QL STRIP.AUTO: NEGATIVE
HOLD SPECIMEN: NORMAL
IMM GRANULOCYTES # BLD AUTO: 0.01 10*3/MM3 (ref 0–0.05)
IMM GRANULOCYTES NFR BLD AUTO: 0.2 % (ref 0–0.5)
KETONES UR QL STRIP: NEGATIVE
LEUKOCYTE ESTERASE UR QL STRIP.AUTO: NEGATIVE
LYMPHOCYTES # BLD AUTO: 0.44 10*3/MM3 (ref 0.7–3.1)
LYMPHOCYTES NFR BLD AUTO: 9.5 % (ref 19.6–45.3)
MCH RBC QN AUTO: 30.3 PG (ref 26.6–33)
MCHC RBC AUTO-ENTMCNC: 32.2 G/DL (ref 31.5–35.7)
MCV RBC AUTO: 94.2 FL (ref 79–97)
METHADONE UR QL SCN: NEGATIVE
MONOCYTES # BLD AUTO: 0.36 10*3/MM3 (ref 0.1–0.9)
MONOCYTES NFR BLD AUTO: 7.8 % (ref 5–12)
NEUTROPHILS NFR BLD AUTO: 3.74 10*3/MM3 (ref 1.7–7)
NEUTROPHILS NFR BLD AUTO: 81.2 % (ref 42.7–76)
NITRITE UR QL STRIP: NEGATIVE
NRBC BLD AUTO-RTO: 0 /100 WBC (ref 0–0.2)
OPIATES UR QL: NEGATIVE
OXYCODONE UR QL SCN: NEGATIVE
PCP UR QL SCN: NEGATIVE
PH UR STRIP.AUTO: 5.5 [PH] (ref 5–8)
PLATELET # BLD AUTO: 271 10*3/MM3 (ref 140–450)
PMV BLD AUTO: 10 FL (ref 6–12)
POTASSIUM SERPL-SCNC: 4.8 MMOL/L (ref 3.5–5.2)
PROT SERPL-MCNC: 7.4 G/DL (ref 6–8.5)
PROT UR QL STRIP: NEGATIVE
RBC # BLD AUTO: 4.32 10*6/MM3 (ref 3.77–5.28)
SODIUM SERPL-SCNC: 141 MMOL/L (ref 136–145)
SP GR UR STRIP: 1.02 (ref 1–1.03)
TRICYCLICS UR QL SCN: NEGATIVE
UROBILINOGEN UR QL STRIP: NORMAL
WBC NRBC COR # BLD AUTO: 4.61 10*3/MM3 (ref 3.4–10.8)

## 2024-06-24 PROCEDURE — 86140 C-REACTIVE PROTEIN: CPT

## 2024-06-24 PROCEDURE — 86225 DNA ANTIBODY NATIVE: CPT

## 2024-06-24 PROCEDURE — 81003 URINALYSIS AUTO W/O SCOPE: CPT

## 2024-06-24 PROCEDURE — 86160 COMPLEMENT ANTIGEN: CPT

## 2024-06-24 PROCEDURE — 99214 OFFICE O/P EST MOD 30 MIN: CPT | Performed by: INTERNAL MEDICINE

## 2024-06-24 PROCEDURE — 80053 COMPREHEN METABOLIC PANEL: CPT

## 2024-06-24 PROCEDURE — 82550 ASSAY OF CK (CPK): CPT

## 2024-06-24 PROCEDURE — 36415 COLL VENOUS BLD VENIPUNCTURE: CPT

## 2024-06-24 PROCEDURE — 85652 RBC SED RATE AUTOMATED: CPT

## 2024-06-24 PROCEDURE — 85025 COMPLETE CBC W/AUTO DIFF WBC: CPT

## 2024-06-24 PROCEDURE — 80307 DRUG TEST PRSMV CHEM ANLYZR: CPT

## 2024-06-24 RX ORDER — MYCOPHENOLATE MOFETIL 500 MG/1
500 TABLET ORAL 2 TIMES DAILY
Qty: 60 TABLET | Refills: 4 | Status: SHIPPED | OUTPATIENT
Start: 2024-06-24

## 2024-06-24 RX ORDER — TRAMADOL HYDROCHLORIDE 50 MG/1
50 TABLET ORAL EVERY 6 HOURS PRN
Qty: 120 TABLET | Refills: 4 | Status: SHIPPED | OUTPATIENT
Start: 2024-06-24

## 2024-06-24 RX ORDER — PREDNISONE 5 MG/1
5 TABLET ORAL DAILY
Qty: 30 TABLET | Refills: 4 | Status: SHIPPED | OUTPATIENT
Start: 2024-06-24

## 2024-06-24 RX ORDER — PILOCARPINE HYDROCHLORIDE 5 MG/1
5 TABLET, FILM COATED ORAL 3 TIMES DAILY PRN
Qty: 90 TABLET | Refills: 3 | Status: SHIPPED | OUTPATIENT
Start: 2024-06-24

## 2024-06-24 RX ORDER — PREGABALIN 225 MG/1
225 CAPSULE ORAL 2 TIMES DAILY
Qty: 60 CAPSULE | Refills: 4 | Status: SHIPPED | OUTPATIENT
Start: 2024-06-24

## 2024-06-24 NOTE — ASSESSMENT & PLAN NOTE
1. She is following with pulmonology (Dr. Dean).    2. She is on Rituximab  3. She is on supplemental oxygen    4. She is on prednisone 5 mg/day   5. She is on CellCept 500 mg PO BID

## 2024-06-24 NOTE — ASSESSMENT & PLAN NOTE
1. She has seen pulmonology and has been diagnosed with NSIP.  2. She needs regularly scheduled dental and ophthalmic examinations.   3. Continue/refill pilocarpine   4. She stopped Plaquenil in 2020 due to leukopenia  5. Continue/refill CellCept  6. She is also getting IV Rituximab   7. She is on 5 mg/day of prednisone

## 2024-06-24 NOTE — ASSESSMENT & PLAN NOTE
1. Plaquenil was stopped 1/2020 due to leukopenia.  2. She is now avoiding NSAIDs with renal insufficiency  3. Continue IV Rituximab every 6 months   4. Check labs  5. Continue/refill prednisone   6. She is taking CellCept 500 mg PO BID

## 2024-06-24 NOTE — ASSESSMENT & PLAN NOTE
* Rituximab given July 8 & 23 rd 2019 (1st doses)  for RA/Sjogren's/Leukopenia  * 2nd doses given 12/2/019 and 12/16/2019   * 3rd doses given 5/5/20 & 6/2/20 (2nd dose delayed due to tick bite)  *4th doses given 1/25/21 & 2/25/21 (2nd dose delayed due to insurance issue/appeal)  * 5th doses 8/30/21 and 9/13/21 (held until 12/2022 due to what was thought to be recurrent PNA)  *6th doses given 12/19/22 and 1/3/23  * Doses given 8/16/23 & 8/30/23    1. Hold if the patient develops infection.   2. Avoid live vaccines while on this medication.   3. No recent serious infections  4. No infusion reactions.  5. Also hold this medication perioperatively if the patient is going to have a surgical procedure

## 2024-06-24 NOTE — PROGRESS NOTES
Office Follow Up      Date: 06/24/2024   Patient Name: Shell Nayak  MRN: [id number]  YOB: 1965    Referring Physician: Provider, No Known     Chief Complaint   Patient presents with    Rheumatoid Arthritis     Follow up    Fibromyalgia     Follow up    Lupus     Follow up    Sjogren's syndrome     Follow up       History of Present Illness: Shell Nayak is a 58 y.o. female who is here today for follow up.     She has been receiving IV Rituximab every 6 months. No infusion reactions. She tolerated these well. No recent infections.     We also prescribe her pilocarpine, Tramadol, prednisone, CellCept and Lyrica.  She has tapered her prednisone to 5 mg/day.      She is followed by pulmonology Dr. Dean for NSIP/ILD. She follows with hematology Dr. Metcalf for leukopenia and anemia. She has received iron infusions. She has seen nephrology for renal insufficiency.     She has chronic and constant pain. Today she rates her pain as 7/10 in severity. She has 1 hour and 30 minutes/day of morning stiffness. No red or hot joints. She has back problems/pain. No muscle pain or weakness.     She has dry eyes and mouth. No trouble swallowing. No salivary gland enlargement.No chest pain. She is short of breath. She coughs. No GI or  issues. No headaches or paresthesias. No hair loss. She gets a facial lupus rash. No lymphadenopathy. No abnormal bruising/bleeding. She is fatigued.      Subjective     Review of Systems   Constitutional:  Positive for fatigue.   HENT: Negative.     Eyes:  Positive for visual disturbance (dry eyes and mouth).   Respiratory:  Positive for cough and shortness of breath.    Cardiovascular: Negative.    Gastrointestinal: Negative.    Endocrine: Positive for cold intolerance and heat intolerance.   Genitourinary: Negative.    Musculoskeletal:  Positive for arthralgias and back pain.   Skin:  Positive for rash.   Allergic/Immunologic: Negative.     Neurological:  Positive for dizziness and memory problem.   Hematological: Negative.    Psychiatric/Behavioral:  The patient is nervous/anxious.    All other systems reviewed and are negative.         Current Outpatient Medications:     albuterol (PROVENTIL) (2.5 MG/3ML) 0.083% nebulizer solution, Take 2.5 mg by nebulization Every 4 (Four) Hours As Needed., Disp: , Rfl:     albuterol sulfate  (90 Base) MCG/ACT inhaler, Inhale 2 puffs Every 4 (Four) Hours As Needed., Disp: , Rfl:     B Complex-Biotin-FA (COMPLEX B-100 PO), Take 1 tablet by mouth Daily., Disp: , Rfl:     Cholecalciferol 125 MCG (5000 UT) tablet, Take 1 tablet by mouth Daily., Disp: , Rfl:     ferrous sulfate 325 (65 FE) MG tablet, Take 1 tablet by mouth Daily With Breakfast., Disp: 30 tablet, Rfl: 5    levothyroxine (SYNTHROID, LEVOTHROID) 112 MCG tablet, Take 1 tablet by mouth., Disp: , Rfl:     Melatonin 10 MG tablet, Take 1 tablet by mouth., Disp: , Rfl:     mycophenolate (CELLCEPT) 500 MG tablet, Take 1 tablet by mouth 2 (Two) Times a Day., Disp: 60 tablet, Rfl: 4    Nutritional Supplements (BOOST ORIGINAL PO), Take  by mouth. 0.04 GRAM 1KCAL/ML ORAL LIQUID TAKE AS DIRECTED, Disp: , Rfl:     pilocarpine (SALAGEN) 5 MG tablet, Take 1 tablet by mouth 3 (Three) Times a Day As Needed (for oral and ocular dryness)., Disp: 90 tablet, Rfl: 3    predniSONE (DELTASONE) 5 MG tablet, Take 1 tablet by mouth Daily., Disp: 30 tablet, Rfl: 4    pregabalin (Lyrica) 225 MG capsule, Take 1 capsule by mouth 2 (Two) Times a Day., Disp: 60 capsule, Rfl: 4    riTUXimab-pvvr (Ruxience) 100 MG/10ML solution injection, Administer RUXIENCE 1000 every 4 months, Disp: , Rfl:     sertraline (ZOLOFT) 100 MG tablet, Take 1 tablet by mouth Daily., Disp: , Rfl:     Symbicort 160-4.5 MCG/ACT inhaler, Inhale 2 puffs 2 (Two) Times a Day., Disp: , Rfl:     TiZANidine (ZANAFLEX) 4 MG capsule, Take 1 capsule by mouth 3 (Three) Times a Day., Disp: , Rfl:     traMADol (ULTRAM)  "50 MG tablet, Take 1 tablet by mouth Every 6 (Six) Hours As Needed for Moderate Pain., Disp: 120 tablet, Rfl: 4    Zinc 50 MG tablet, Take 1 tablet by mouth Daily., Disp: , Rfl:     Allergies   Allergen Reactions    Penicillins Rash     Hasn't had since childhood       I have reviewed and updated the patient's chief complaint, history of present illness, review of systems, past medical history, surgical history, family history, social history, medications and allergy list as appropriate.     Objective      Vitals:    06/24/24 1015   BP: 122/90   BP Location: Left arm   Pulse: 73   Temp: 98.2 °F (36.8 °C)   Weight: 76.2 kg (168 lb)   Height: 167.6 cm (65.98\")   PainSc:   7   PainLoc: Back     Body mass index is 27.13 kg/m².      Physical Exam   General: Well appearing 58 year old  female. Not in distress. She is ambulating unassisted.    SKIN: No rashes. No alopecia. No subcutaneous nodules. No digital pits or ulcers. No sclerodactyly.   HEENT: NCAT. Conjunctiva clear, no photophobia. No oral or nasal ulcers. Hearing intact.    Pulmonary: No wheezing, rales, or rhonchi. Clear to auscultation bilaterally.   CV: Regular rate and regular rhythm. No murmurs, rubs, or gallops.   Psych: Normal mood and affect. Alert and oriented x 3.   Extremities: No cyanosis. Trace edema in the ankles bilaterally.   Musculoskeletal: No joint swelling. No warmth or erythema. Normal range of motion of the wrists, ankles, elbows, and knees. She has kyphotic posture. She has myofascial tenderness.   Lymph: No palpable cervical adenopathy    * please note she is on 5 mg/day of prednisone      Procedures    Assessment / Plan      Assessment & Plan  Systemic lupus erythematosus, unspecified SLE type, unspecified organ involvement status  * 4/6/2011: MIN Direct positive, RF 99.1 (< 13.9 normal), DS DNA elevated, RNP normal, Pisano normal, SSA +, SSB +, ESR 51.0*  * 3/9/2018: IgM Rf +, IgA RF +, IgG RF +; CCP negative  * 8/12/22: dsDNA " 1:320, C3 low at 58, C4 low at <2, ESR 51, CRP 37, UA with 1+ protein, Creatinine 1.01 GFR 66, Hgb 11  *11/2022: hepatitis panel negative, CRP 62, ESR 59, Hemoglobin 7.8, Platelets 498, Creatinine 1.52 GFR 40  * Medications/treatments/interventions tried include: Plaquenil, Naproxen, Trazodone, She saw Dr. Vizcarra (Rheumatologist), Cymbalta, Savella, Lyrica, Tramadol, Rituximab, she has seen pulmonology, prednisone, CellCept, Tizanidine    1. Plaquenil was stopped 1/2020 due to leukopenia.  2. She avoids oral NSAIDs due to renal insufficiency. She has seen nephrology.    3. She has seen pulmonology and has been diagnosed with NSIP.    4. Continue IV Rituximab every 6 months. We will plan/schedule her next dosing. Risks and benefits reviewed.   5. We will check labs today  6. Follow up 3-4 months  7. She has seen hematology for anemia. She had iron infusions.   8. Continue/refill Cellcept   9. Continue/refill prednisone  10. We gave her a handout on SLE to take home and review     Seropositive rheumatoid arthritis  1. Plaquenil was stopped 1/2020 due to leukopenia.  2. She is now avoiding NSAIDs with renal insufficiency  3. Continue IV Rituximab every 6 months   4. Check labs  5. Continue/refill prednisone   6. She is taking CellCept 500 mg PO BID   Sjogren's syndrome with keratoconjunctivitis sicca  1. She has seen pulmonology and has been diagnosed with NSIP.  2. She needs regularly scheduled dental and ophthalmic examinations.   3. Continue/refill pilocarpine   4. She stopped Plaquenil in 2020 due to leukopenia  5. Continue/refill CellCept  6. She is also getting IV Rituximab   7. She is on 5 mg/day of prednisone   Fibromyalgia  1. She has seen pulmonology and has been diagnosed with NSIP.  2. She needs regularly scheduled dental and ophthalmic examinations.   3. Continue/refill pilocarpine   4. She stopped Plaquenil in 2020 due to leukopenia  5. Continue/refill CellCept  6. She is also getting IV Rituximab   7.  She is on 5 mg/day of prednisone   Immunodeficiency due to drug therapy  * Rituximab given July 8 & 23 rd 2019 (1st doses)  for RA/Sjogren's/Leukopenia  * 2nd doses given 12/2/019 and 12/16/2019   * 3rd doses given 5/5/20 & 6/2/20 (2nd dose delayed due to tick bite)  *4th doses given 1/25/21 & 2/25/21 (2nd dose delayed due to insurance issue/appeal)  * 5th doses 8/30/21 and 9/13/21 (held until 12/2022 due to what was thought to be recurrent PNA)  *6th doses given 12/19/22 and 1/3/23  * Doses given 8/16/23 & 8/30/23    1. Hold if the patient develops infection.   2. Avoid live vaccines while on this medication.   3. No recent serious infections  4. No infusion reactions.  5. Also hold this medication perioperatively if the patient is going to have a surgical procedure  NSIP (nonspecific interstitial pneumonia)  1. She is following with pulmonology (Dr. Dean).    2. She is on Rituximab  3. She is on supplemental oxygen    4. She is on prednisone 5 mg/day   5. She is on CellCept 500 mg PO BID  Current use of steroid medication  * Prednisone 5 mg/day for RA/Sjogren's/ILD  Ideally she will taper off as her condition improves/stabilizes  Leukopenia, unspecified type  1. We think that this is due to her RA/lupus. 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 She saw hematology who also believed this was the cause.  2. Check labs today     Encounter for therapeutic drug monitoring  * Lyrica 225 mg twice/day for fibromyalgia   * Tramadol 50 mg every 6 hours PRN  * Controlled substance agreement updated 6/24/24  Check IRENE and Drug screen as required.  Drug screen ordered today    High risk medication use  * CellCept 500 mg PO BID For SLE/RA/Sjogren's overlap  1. CBC and CMP every 8-12 weeks to monitor for medication toxicity.   2. No recent serious infections.  3. Refill today    Orders Placed This Encounter   Procedures    CBC Auto Differential    Comprehensive Metabolic Panel    C-reactive Protein     Sedimentation Rate    Urine Drug Screen - Urine, Clean Catch    Anti-DNA Antibody, Double-stranded    C4+C3    CK    Urinalysis With Culture If Indicated -     New Medications Ordered This Visit   Medications    mycophenolate (CELLCEPT) 500 MG tablet     Sig: Take 1 tablet by mouth 2 (Two) Times a Day.     Dispense:  60 tablet     Refill:  4    pregabalin (Lyrica) 225 MG capsule     Sig: Take 1 capsule by mouth 2 (Two) Times a Day.     Dispense:  60 capsule     Refill:  4    pilocarpine (SALAGEN) 5 MG tablet     Sig: Take 1 tablet by mouth 3 (Three) Times a Day As Needed (for oral and ocular dryness).     Dispense:  90 tablet     Refill:  3    predniSONE (DELTASONE) 5 MG tablet     Sig: Take 1 tablet by mouth Daily.     Dispense:  30 tablet     Refill:  4    traMADol (ULTRAM) 50 MG tablet     Sig: Take 1 tablet by mouth Every 6 (Six) Hours As Needed for Moderate Pain.     Dispense:  120 tablet     Refill:  4         Follow Up:   Return in about 4 months (around 10/24/2024).      John Greer DO  [facility]

## 2024-06-24 NOTE — ASSESSMENT & PLAN NOTE
* CellCept 500 mg PO BID For SLE/RA/Sjogren's overlap  1. CBC and CMP every 8-12 weeks to monitor for medication toxicity.   2. No recent serious infections.  3. Refill today

## 2024-06-24 NOTE — ASSESSMENT & PLAN NOTE
1. We think that this is due to her RA/lupus. 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 She saw hematology who also believed this was the cause.  2. Check labs today

## 2024-06-24 NOTE — ASSESSMENT & PLAN NOTE
* Lyrica 225 mg twice/day for fibromyalgia   * Tramadol 50 mg every 6 hours PRN  * Controlled substance agreement updated 6/24/24  Check IRENE and Drug screen as required.  Drug screen ordered today

## 2024-06-24 NOTE — ASSESSMENT & PLAN NOTE
* Prednisone 5 mg/day for RA/Sjogren's/ILD  Ideally she will taper off as her condition improves/stabilizes

## 2024-06-24 NOTE — ASSESSMENT & PLAN NOTE
* 4/6/2011: MIN Direct positive, RF 99.1 (< 13.9 normal), DS DNA elevated, RNP normal, Pisano normal, SSA +, SSB +, ESR 51.0*  * 3/9/2018: IgM Rf +, IgA RF +, IgG RF +; CCP negative  * 8/12/22: dsDNA 1:320, C3 low at 58, C4 low at <2, ESR 51, CRP 37, UA with 1+ protein, Creatinine 1.01 GFR 66, Hgb 11  *11/2022: hepatitis panel negative, CRP 62, ESR 59, Hemoglobin 7.8, Platelets 498, Creatinine 1.52 GFR 40  * Medications/treatments/interventions tried include: Plaquenil, Naproxen, Trazodone, She saw Dr. Vizcarra (Rheumatologist), Cymbalta, Savella, Lyrica, Tramadol, Rituximab, she has seen pulmonology, prednisone, CellCept, Tizanidine    1. Plaquenil was stopped 1/2020 due to leukopenia.  2. She avoids oral NSAIDs due to renal insufficiency. She has seen nephrology.    3. She has seen pulmonology and has been diagnosed with NSIP.    4. Continue IV Rituximab every 6 months. We will plan/schedule her next dosing. Risks and benefits reviewed.   5. We will check labs today  6. Follow up 3-4 months  7. She has seen hematology for anemia. She had iron infusions.   8. Continue/refill Cellcept   9. Continue/refill prednisone  10. We gave her a handout on SLE to take home and review

## 2024-06-25 ENCOUNTER — SPECIALTY PHARMACY (OUTPATIENT)
Age: 59
End: 2024-06-25
Payer: COMMERCIAL

## 2024-06-26 ENCOUNTER — TELEPHONE (OUTPATIENT)
Age: 59
End: 2024-06-26
Payer: COMMERCIAL

## 2024-06-26 LAB — DSDNA AB SER-ACNC: 10 IU/ML (ref 0–9)

## 2024-06-26 NOTE — TELEPHONE ENCOUNTER
Called and spoke with [facility] as it looks like we sent patient there. Patient received treatment on 03/07/24 and 03/21/24. The intake person I spoke with stated patient is scheduled for 06/27/24. This puts appointment 14 weeks from last treatment and there needs to be a minimum of 16 weeks between treatment. I called and left their intake team a message about changing appointment for patient. I also called patient and requested a call back. I provided her the number to my direct line.

## 2024-06-26 NOTE — TELEPHONE ENCOUNTER
----- Message from John Greer sent at 6/24/2024 10:36 AM EDT -----  Regarding: Rituximab  At the [facility] her last infusion of Rituximab was 8/30/23. She was unsure if she had a dose this Spring ? She says her memory is bad. Please review her chart. If we did not give her a dose yet this year at an outside facility please help this get set up.

## 2024-10-25 ENCOUNTER — OFFICE VISIT (OUTPATIENT)
Dept: PULMONOLOGY | Facility: CLINIC | Age: 59
End: 2024-10-25
Payer: COMMERCIAL

## 2024-10-25 VITALS
BODY MASS INDEX: 26.84 KG/M2 | HEART RATE: 89 BPM | WEIGHT: 167 LBS | DIASTOLIC BLOOD PRESSURE: 80 MMHG | TEMPERATURE: 97.8 F | SYSTOLIC BLOOD PRESSURE: 112 MMHG | OXYGEN SATURATION: 96 % | RESPIRATION RATE: 18 BRPM | HEIGHT: 66 IN

## 2024-10-25 DIAGNOSIS — J96.11 CHRONIC RESPIRATORY FAILURE WITH HYPOXIA: ICD-10-CM

## 2024-10-25 DIAGNOSIS — M06.9 RHEUMATOID ARTHRITIS INVOLVING MULTIPLE SITES, UNSPECIFIED WHETHER RHEUMATOID FACTOR PRESENT: ICD-10-CM

## 2024-10-25 DIAGNOSIS — M32.13 SYSTEMIC LUPUS ERYTHEMATOSUS WITH LUNG INVOLVEMENT, UNSPECIFIED SLE TYPE: ICD-10-CM

## 2024-10-25 DIAGNOSIS — J84.9 ILD (INTERSTITIAL LUNG DISEASE): Primary | ICD-10-CM

## 2024-10-25 RX ORDER — LEVOCETIRIZINE DIHYDROCHLORIDE 5 MG/1
5 TABLET, FILM COATED ORAL EVERY EVENING
COMMUNITY

## 2024-10-25 NOTE — PROGRESS NOTES
"Follow Up Office Note       Patient Name: Shell Nayak    Referring Physician: No ref. provider found    Chief Complaint:    Chief Complaint   Patient presents with    NSIP     F/u       History of Present Illness: Shell Nayak is a 59 y.o. female who is here today to follow-up care with Pulmonary.   Patient's past medical history significant for hypothyroidism, Sjogren's syndrome, lupus, rheumatoid arthritis, and fibromyalgia.   Patient hospitalized at the [facility] back in July 2024 with pneumonia.  Had a fall at that time as well denies any chest pain, nausea, fever, or chills.  Autoimmune disease have been fairly well-controlled lately.    Review of Systems:   Review of Systems   Constitutional:  Negative for chills, fatigue and fever.   HENT:  Negative for congestion and voice change.    Eyes:  Negative for blurred vision.   Respiratory:  Negative for cough, shortness of breath and wheezing.    Cardiovascular:  Negative for chest pain.   Skin:  Negative for dry skin.   Hematological:  Negative for adenopathy.   Psychiatric/Behavioral:  Negative for agitation and depressed mood.        The following portions of the patient's history were reviewed and updated as appropriate: allergies, current medications, past family history, past medical history, past social history, past surgical history and problem list.    Physical Exam:  Vital Signs:   Vitals:    10/25/24 1019   BP: 112/80   Pulse: 89   Resp: 18   Temp: 97.8 °F (36.6 °C)   SpO2: 96%  Comment: room air at resting   Weight: 75.8 kg (167 lb)   Height: 167.6 cm (65.98\")         Physical Exam  Vitals and nursing note reviewed.   Constitutional:       General: She is not in acute distress.     Appearance: She is well-developed and normal weight. She is not ill-appearing or toxic-appearing.   HENT:      Head: Normocephalic and atraumatic.   Cardiovascular:      Rate and Rhythm: Normal rate and regular rhythm.      Pulses: Normal " pulses.      Heart sounds: Normal heart sounds. No murmur heard.     No friction rub. No gallop.   Pulmonary:      Effort: Pulmonary effort is normal. No respiratory distress.      Breath sounds: Normal breath sounds. No wheezing, rhonchi or rales.   Musculoskeletal:      Right lower leg: No edema.      Left lower leg: No edema.   Skin:     General: Skin is warm and dry.   Neurological:      Mental Status: She is alert and oriented to person, place, and time.         Immunization History   Administered Date(s) Administered    COVID-19 (PFIZER) Purple Cap Monovalent 04/16/2021, 05/07/2021, 01/24/2022    Fluzone (or Fluarix & Flulaval for VFC) >6mos 11/18/2022, 09/15/2023    PPD Test 10/26/2022    Pneumococcal Polysaccharide (PPSV23) 11/18/2022    Td (TDVAX) 08/02/2021    Td, Not Adsorbed 08/02/2021       Results Review:   -I personally viewed the patient's chest x-ray from 10/25/2024, shows the right upper lobe infiltrate has now resolved, no acute process seen.  -I personally viewed the patient's CT scan of the chest from July 2024, notable for a large right middle lobe pneumonia.  No other concerning nodule, mass or infiltrate seen.  - CT of the chest from 4/28/2023, there is no new nodules, masses, or infiltrates, there is atelectasis and scarring in the bases bilaterally unchanged compared to August 2022.              - CT scan of the chest from August 2022 showed bilateral infiltrates in the lower lobes, chest x-ray also consistent with this and low lung volumes.  Also reviewed the CT scan of the chest from May 2022 which also showed increased interstitial markings and infiltrates bilaterally, the CT scan of the chest in 2021 did not show this finding.  -I personally viewed the patient's PFTs from 10/25/2024 which showed moderate restriction without obstruction and a moderately reduced DLCO, overall stable compared to previous PFTs, FVC 55%, FEV1 55%, TLC 67%, DLCO 52%  - PFTs from 12/7/2023 which showed  moderate restriction without obstruction and a severely reduced DLCO, FEV1 now 55%, total lung capacity 62%, FVC 57%, and DLCO 44%.  - PFTs from 5/1/2023 which showed severe restriction without obstruction and a severely reduced DLCO, FVC 41%, FEV1 39%, total lung capacity 46%, residual volume 43%, DLCO 40%.              - PFT from 9/6/2022 showed severe restriction without restriction and a severely reduced DLCO.  - 6-minute walk test on 5/1/2023 she started percent on room air, dropped down to 88% with placed on 2 L pulsed dose and maintain on the 2 L pulsed dose.  She was able to walk 457 m which was 84% of predicted.  -pathology results from 9/15/2022, station 7 lymph node showed benign reactive lymph node, and right lower lobe transbronchial biopsy show obviated lung with mild chronic inflammation and focal fibrosis, negative for granulomas, atypia or malignancy.  -culture results from bronchoscopy on 9/15/2022, fungus culture, Aspergillus galactomannan, AFB, and respiratory culture were all negative.    Assessment / Plan:   Diagnoses and all orders for this visit:    1. NSIP (Primary)  2. Chronic respiratory failure with hypoxia  3. Systemic lupus erythematosus with lung involvement, unspecified SLE type  4. Rheumatoid arthritis involving multiple sites, unspecified whether rheumatoid factor present  -From a NSIP standpoint her disease is stable.  She has maintained off of her oxygen only utilizing at night.  I want to continue 2 L of cannula at night.  For autoimmune disease I want her to continue on the Rituxan and the CellCept.  She has albuterol to use on a as needed basis.  She has already completed her course of antibiotics.  It looks like she just had a pneumonia back in July which is now resolved.  We will continue to monitor her PFTs in addition to imaging as needed.  Recommend 30 minutes cardiovascular exercise per day      Follow Up:   Return in about 1 year (around 10/25/2025) for PFTs.       ALFA  Raad Dean, DO  Pulmonary and Critical Care Medicine  Note Electronically Signed    Part of this note may be an electronic transcription/translation of spoken language to printed text using the Dragon Dictation System.

## 2024-11-18 RX ORDER — PILOCARPINE HYDROCHLORIDE 5 MG/1
TABLET, FILM COATED ORAL
Qty: 90 TABLET | Refills: 3 | Status: SHIPPED | OUTPATIENT
Start: 2024-11-18

## 2025-01-13 ENCOUNTER — TELEPHONE (OUTPATIENT)
Age: 60
End: 2025-01-13
Payer: COMMERCIAL

## 2025-01-13 NOTE — TELEPHONE ENCOUNTER
Hub staff attempted to follow warm transfer process and was unsuccessful     Caller: JOSE DORANTES    Relationship to patient: SELF    Best call back number: [phone]     Patient is needing: MS. DORANTES CALLED TO SCHEDULE FOLLOW UP APPT. SHE IS SICK AND IS ASKING IF THE APPT THAT IS SCHEDULED ON 1/16/25 @ 3:15 WITH MAGUI HARRY CAN BE A MYCHART VISIT. PLEASE REVIEW AND ADVISE.    OK TO CALL BACK ANYTIME. OK TO LEAVE .

## 2025-01-14 PROBLEM — M06.9 RHEUMATOID ARTHRITIS INVOLVING MULTIPLE SITES: Status: RESOLVED | Noted: 2023-11-20 | Resolved: 2025-01-14

## 2025-01-14 NOTE — ASSESSMENT & PLAN NOTE
* Lyrica 225 mg twice/day for fibromyalgia   * Tramadol  mg every 8 hours PRN  * Controlled substance agreement updated 6/24/24  PDMP reviewed  Drug screen UTD 6/24/24

## 2025-01-14 NOTE — ASSESSMENT & PLAN NOTE
* 4/6/2011: MIN Direct positive, RF 99.1 (< 13.9 normal), DS DNA elevated, RNP normal, Pisano normal, SSA +, SSB +, ESR 51.0*  * 3/9/2018: IgM Rf +, IgA RF +, IgG RF +; CCP negative  * 8/12/22: dsDNA 1:320, C3 low at 58, C4 low at <2, ESR 51, CRP 37, UA with 1+ protein, Creatinine 1.01 GFR 66, Hgb 11  *11/2022: hepatitis panel negative, CRP 62, ESR 59, Hemoglobin 7.8, Platelets 498, Creatinine 1.52 GFR 40  * Medications/treatments/interventions tried include: Plaquenil, Naproxen, Trazodone, She saw Dr. Vizcarra (Rheumatologist), Cymbalta, Savella, Lyrica, Tramadol, Rituximab, she has seen pulmonology, prednisone, CellCept, Tizanidine    1. Plaquenil was stopped 1/2020 due to leukopenia.  2. She avoids oral NSAIDs due to renal insufficiency. She has seen nephrology.  3. She has seen pulmonology and has been diagnosed with NSIP.    4. Continue IV Rituximab every 4 months. 11/18/24 and 12/11/24 were her most recent doses.  5. We will check labs. We will mail her an order.  6. Follow up 3-4 months  7. She has seen hematology for anemia. She has had iron infusions. She has been released from hematology.  8. Continue/refill Cellcept   9. Continue/refill prednisone 5 mg daily as needed

## 2025-01-14 NOTE — ASSESSMENT & PLAN NOTE
1. She is following with pulmonology (Dr. Dean).    2. She is on Rituximab every 6 months  3. She is on supplemental oxygen at night.  4. She is on prednisone 5 mg/day as needed  5. She is on CellCept 500 mg PO BID

## 2025-01-14 NOTE — ASSESSMENT & PLAN NOTE
* Rituximab given July 8 & 23 rd 2019 (1st doses)  for RA/Sjogren's/Leukopenia  * 2nd doses given 12/2/019 and 12/16/2019   * 3rd doses given 5/5/20 & 6/2/20 (2nd dose delayed due to tick bite)  *4th doses given 1/25/21 & 2/25/21 (2nd dose delayed due to insurance issue/appeal)  * 5th doses 8/30/21 and 9/13/21 (held until 12/2022 due to what was thought to be recurrent PNA)  *6th doses given 12/19/22 and 1/3/23  * 7th doses given 8/16/23 & 8/30/23  *8th doses 11/18/24 and 12/11/24    1. Hold if the patient develops infection.   2. Avoid live vaccines while on this medication.   3. No recent serious infections  4. No infusion reactions.  5. Also hold this medication perioperatively if the patient is going to have a surgical procedure

## 2025-01-14 NOTE — PROGRESS NOTES
Teleheath E-Visit     Date: 01/16/2025   Patient Name: Shell Nayak  MRN: [id number]  YOB: 1965    Referring Physician: No ref. provider found     Chief Complaint   Patient presents with    Lupus     I have reviewed the E-Visit questionnaire and the patient's answers, my assessment and plan are listed below.      This provider is located at the [facility] (through [facility]), [address] [place] [address], [address] [place] [address] using a secure [employer] Video Visit through [employer]. Patient is being seen remotely via telehealth at their home address in [place], and stated they are in a secure environment for this session. The patient's condition being diagnosed/treated is appropriate for telemedicine. The provider identified herself as well as her credentials. The patient, and/or patients guardian, consent to be seen remotely, and when consent is given they understand that the consent allows for patient identifiable information to be sent to a third party as needed. They may refuse to be seen remotely at any time. The electronic data is encrypted and password protected, and the patient and/or guardian has been advised of the potential risks to privacy not withstanding such measures.     You have chosen to receive care through a telehealth visit. Do you consent to use a video/audio connection for your medical care today? Yes    History of Present Illness: Shell Nayak is a 59 y.o. female who is here today for follow up of lupus.    She has been receiving IV Rituximab every 6 months. No infusion reactions. She tolerated these well. No recent infections.  Most recent infusions were given November and December 2024.    We also prescribe her pilocarpine, Tramadol, prednisone, CellCept and Lyrica.  She has tapered her prednisone to 5 mg/day.      She is followed by pulmonology Dr. Dean for NSIP/ILD.   She follows with  hematology Dr. Metcalf for leukopenia and anemia. She has received iron infusions. She has been released from hematology.  She has seen nephrology for renal insufficiency.     She has chronic and constant pain. Today she rates her pain as 7/10 in severity. She has 1 hour and 30 minutes/day of morning stiffness. No red or hot joints. She has back problems/pain. No muscle pain or weakness.     She has dry eyes and mouth. No trouble swallowing. No salivary gland enlargement.No chest pain. She is short of breath. She coughs. No GI or  issues. No headaches or paresthesias. No hair loss. She gets a facial lupus rash. No lymphadenopathy. No abnormal bruising/bleeding. She is fatigued.    She had back pain and had MRI in 11/2024 and was found to have compression fractures. She had to wear a back brace. She saw NS at .  She had a DEXA that showed osteopenia.     She had PNA in 7/2024.   Currently she is battling with a respiratory infection and ear infection. She is on doxycycline.  She last saw pulm 11/2024.       Subjective           Current Outpatient Medications:     mycophenolate (CELLCEPT) 500 MG tablet, Take 1 tablet by mouth 2 (Two) Times a Day., Disp: 60 tablet, Rfl: 4    pilocarpine (SALAGEN) 5 MG tablet, Take 1 tablet by mouth 2 (Two) Times a Day., Disp: 60 tablet, Rfl: 4    predniSONE (DELTASONE) 5 MG tablet, Take 1 tablet by mouth Daily., Disp: 30 tablet, Rfl: 4    pregabalin (Lyrica) 225 MG capsule, Take 1 capsule by mouth 2 (Two) Times a Day., Disp: 60 capsule, Rfl: 4    traMADol (ULTRAM) 50 MG tablet, Take 1-2 tablets by mouth Every 8 (Eight) Hours As Needed for Moderate Pain., Disp: 180 tablet, Rfl: 4    albuterol (PROVENTIL) (2.5 MG/3ML) 0.083% nebulizer solution, Take 2.5 mg by nebulization Every 4 (Four) Hours As Needed., Disp: , Rfl:     albuterol sulfate  (90 Base) MCG/ACT inhaler, Inhale 2 puffs Every 4 (Four) Hours As Needed., Disp: , Rfl:     alendronate (FOSAMAX) 70 MG tablet, Take 1  tablet by mouth Every 7 (Seven) Days., Disp: 12 tablet, Rfl: 1    B Complex-Biotin-FA (COMPLEX B-100 PO), Take 1 tablet by mouth Daily., Disp: , Rfl:     Cholecalciferol 125 MCG (5000 UT) tablet, Take 1 tablet by mouth Daily., Disp: , Rfl:     ferrous sulfate 325 (65 FE) MG tablet, Take 1 tablet by mouth Daily With Breakfast., Disp: 30 tablet, Rfl: 5    levocetirizine (XYZAL) 5 MG tablet, Take 1 tablet by mouth Every Evening., Disp: , Rfl:     levothyroxine (SYNTHROID, LEVOTHROID) 112 MCG tablet, Take 1 tablet by mouth., Disp: , Rfl:     Melatonin 10 MG tablet, Take 1 tablet by mouth., Disp: , Rfl:     naloxone (NARCAN) 4 MG/0.1ML nasal spray, Administer 1 spray into the nostril(s) as directed by provider As Needed for Opioid Reversal., Disp: , Rfl:     Nutritional Supplements (BOOST ORIGINAL PO), Take  by mouth. 0.04 GRAM 1KCAL/ML ORAL LIQUID TAKE AS DIRECTED, Disp: , Rfl:     riTUXimab-pvvr (Ruxience) 100 MG/10ML solution injection, Administer RUXIENCE 1000 every 4 months, Disp: , Rfl:     sertraline (ZOLOFT) 100 MG tablet, Take 1 tablet by mouth Daily., Disp: , Rfl:     Symbicort 160-4.5 MCG/ACT inhaler, Inhale 2 puffs 2 (Two) Times a Day., Disp: , Rfl:     TiZANidine (ZANAFLEX) 4 MG capsule, Take 1 capsule by mouth 3 (Three) Times a Day., Disp: , Rfl:     Zinc 50 MG tablet, Take 1 tablet by mouth Daily., Disp: , Rfl:     Allergies   Allergen Reactions    Penicillins Rash     Hasn't had since childhood       I have reviewed and updated the patient's chief complaint, history of present illness, review of systems, past medical history, surgical history, family history, social history, medications and allergy list as appropriate.     Objective      Vitals:    01/16/25 1521   PainSc:   3   PainLoc: Generalized       Physical Exam   General: Well appearing 59 year old  female. Not in distress. She is ambulating unassisted.        Assessment / Plan      Assessment & Plan  Systemic lupus erythematosus,  unspecified SLE type, unspecified organ involvement status  * 4/6/2011: MIN Direct positive, RF 99.1 (< 13.9 normal), DS DNA elevated, RNP normal, Pisano normal, SSA +, SSB +, ESR 51.0*  * 3/9/2018: IgM Rf +, IgA RF +, IgG RF +; CCP negative  * 8/12/22: dsDNA 1:320, C3 low at 58, C4 low at <2, ESR 51, CRP 37, UA with 1+ protein, Creatinine 1.01 GFR 66, Hgb 11  *11/2022: hepatitis panel negative, CRP 62, ESR 59, Hemoglobin 7.8, Platelets 498, Creatinine 1.52 GFR 40  * Medications/treatments/interventions tried include: Plaquenil, Naproxen, Trazodone, She saw Dr. Vizcarra (Rheumatologist), Cymbalta, Savella, Lyrica, Tramadol, Rituximab, she has seen pulmonology, prednisone, CellCept, Tizanidine    1. Plaquenil was stopped 1/2020 due to leukopenia.  2. She avoids oral NSAIDs due to renal insufficiency. She has seen nephrology.  3. She has seen pulmonology and has been diagnosed with NSIP.    4. Continue IV Rituximab every 4 months. 11/18/24 and 12/11/24 were her most recent doses.  5. We will check labs. We will mail her an order.  6. Follow up 3-4 months  7. She has seen hematology for anemia. She has had iron infusions. She has been released from hematology.  8. Continue/refill Cellcept   9. Continue/refill prednisone 5 mg daily as needed    Seropositive rheumatoid arthritis  1. Plaquenil was stopped 1/2020 due to leukopenia.  2. She is now avoiding NSAIDs with renal insufficiency  3. Continue IV Rituximab every 6 months   4. Check labs  5. Continue/refill prednisone   6. She is taking CellCept 500 mg PO BID   Sjogren's syndrome with keratoconjunctivitis sicca  1. She has seen pulmonology and has been diagnosed with NSIP.  2. She needs regularly scheduled dental and ophthalmic examinations.   3. Continue/refill pilocarpine   4. She stopped Plaquenil in 2020 due to leukopenia  5. Continue/refill CellCept  6. She is also getting IV Rituximab   7. She is on 5 mg/day of prednisone   High risk medication use  * CellCept  500 mg PO BID For SLE/RA/Sjogren's overlap    1. CBC and CMP every 8-12 weeks to monitor for medication toxicity.   2. No recent serious infections.  3. Refill today  Immunodeficiency due to drug therapy  * Rituximab given July 8 & 23 rd 2019 (1st doses)  for RA/Sjogren's/Leukopenia  * 2nd doses given 12/2/019 and 12/16/2019   * 3rd doses given 5/5/20 & 6/2/20 (2nd dose delayed due to tick bite)  *4th doses given 1/25/21 & 2/25/21 (2nd dose delayed due to insurance issue/appeal)  * 5th doses 8/30/21 and 9/13/21 (held until 12/2022 due to what was thought to be recurrent PNA)  *6th doses given 12/19/22 and 1/3/23  * 7th doses given 8/16/23 & 8/30/23  *8th doses 11/18/24 and 12/11/24    1. Hold if the patient develops infection.   2. Avoid live vaccines while on this medication.   3. No recent serious infections  4. No infusion reactions.  5. Also hold this medication perioperatively if the patient is going to have a surgical procedure  Encounter for therapeutic drug monitoring  * Lyrica 225 mg twice/day for fibromyalgia   * Tramadol  mg every 8 hours PRN  * Controlled substance agreement updated 6/24/24  PDMP reviewed  Drug screen UTD 6/24/24    NSIP (nonspecific interstitial pneumonia)  1. She is following with pulmonology (Dr. Dean).    2. She is on Rituximab every 6 months  3. She is on supplemental oxygen at night.  4. She is on prednisone 5 mg/day as needed  5. She is on CellCept 500 mg PO BID  Current use of steroid medication  * Prednisone 5 mg/day for RA/Sjogren's/ILD  Ideally she will taper off as her condition improves/stabilizes  Fibromyalgia  1. She has seen pulmonology and has been diagnosed with NSIP.  2. She needs regularly scheduled dental and ophthalmic examinations.   3. Continue/refill pilocarpine   4. She stopped Plaquenil in 2020 due to leukopenia  5. Continue/refill CellCept  6. She is also getting IV Rituximab   7. She is on 5 mg/day of prednisone   Leukopenia, unspecified type  1. We  think that this is due to her RA/lupus. 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 She saw hematology who also believed this was the cause. She has been released from hematology.    Other fatigue  Update QTB and hepatitis panel  Localized osteoporosis with current pathological fracture with routine healing  * DEXA  11/26/2024: Lumbar spine -1.7, left femoral neck -1.4, left total hip -0.3, major osteoporotic fracture risk 25%, hip fracture risk 2.6%  *She has had numerous compression fractures in her spine since her last office visit  *Alendronate started 1/2025    We discussed that osteopenia plus fracture equals osteoporosis.  We will start her on alendronate once weekly  Continue to try to wean steroids as able  Encouraged weightbearing exercise  Repeat DEXA due November 2026  Encouraged calcium and vitamin D supplementation if not contraindicated      22 minutes were spent reviewing the patient's questionnaire, formulating a treatment plan, and relaying information to the patient via [employer]       Follow Up:   Return in about 4 months (around 5/16/2025) for JHOAN Jama, Dr. Greer.      JHOAN Jama  [facility]

## 2025-01-14 NOTE — ASSESSMENT & PLAN NOTE
1. We think that this is due to her RA/lupus. 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 She saw hematology who also believed this was the cause. She has been released from hematology.

## 2025-01-16 ENCOUNTER — TELEMEDICINE (OUTPATIENT)
Age: 60
End: 2025-01-16
Payer: COMMERCIAL

## 2025-01-16 DIAGNOSIS — M80.80XD LOCALIZED OSTEOPOROSIS WITH CURRENT PATHOLOGICAL FRACTURE WITH ROUTINE HEALING: ICD-10-CM

## 2025-01-16 DIAGNOSIS — Z79.899 IMMUNODEFICIENCY DUE TO DRUG THERAPY: Chronic | ICD-10-CM

## 2025-01-16 DIAGNOSIS — Z79.899 HIGH RISK MEDICATION USE: Chronic | ICD-10-CM

## 2025-01-16 DIAGNOSIS — J84.89 NSIP (NONSPECIFIC INTERSTITIAL PNEUMONIA): Chronic | ICD-10-CM

## 2025-01-16 DIAGNOSIS — M05.9 SEROPOSITIVE RHEUMATOID ARTHRITIS: Chronic | ICD-10-CM

## 2025-01-16 DIAGNOSIS — M35.01 SJOGREN'S SYNDROME WITH KERATOCONJUNCTIVITIS SICCA: Chronic | ICD-10-CM

## 2025-01-16 DIAGNOSIS — R53.83 OTHER FATIGUE: ICD-10-CM

## 2025-01-16 DIAGNOSIS — Z51.81 ENCOUNTER FOR THERAPEUTIC DRUG MONITORING: Chronic | ICD-10-CM

## 2025-01-16 DIAGNOSIS — Z79.52 CURRENT USE OF STEROID MEDICATION: Chronic | ICD-10-CM

## 2025-01-16 DIAGNOSIS — M32.9 SYSTEMIC LUPUS ERYTHEMATOSUS, UNSPECIFIED SLE TYPE, UNSPECIFIED ORGAN INVOLVEMENT STATUS: Primary | Chronic | ICD-10-CM

## 2025-01-16 DIAGNOSIS — M79.7 FIBROMYALGIA: Chronic | ICD-10-CM

## 2025-01-16 DIAGNOSIS — D72.819 LEUKOPENIA, UNSPECIFIED TYPE: Chronic | ICD-10-CM

## 2025-01-16 DIAGNOSIS — D84.821 IMMUNODEFICIENCY DUE TO DRUG THERAPY: Chronic | ICD-10-CM

## 2025-01-16 PROBLEM — M35.00 SJOGREN SYNDROME, UNSPECIFIED: Status: ACTIVE | Noted: 2024-07-02

## 2025-01-16 PROBLEM — D64.9 ANEMIA: Status: ACTIVE | Noted: 2025-01-16

## 2025-01-16 PROBLEM — S12.600A CLOSED FRACTURE OF SEVENTH CERVICAL VERTEBRA: Status: ACTIVE | Noted: 2024-07-02

## 2025-01-16 PROBLEM — N20.0 NEPHROLITHIASIS: Status: ACTIVE | Noted: 2024-07-02

## 2025-01-16 PROBLEM — K82.8 BILIARY DYSKINESIA: Status: ACTIVE | Noted: 2025-01-16

## 2025-01-16 PROBLEM — E04.1 THYROID NODULE: Status: ACTIVE | Noted: 2024-07-02

## 2025-01-16 PROBLEM — E03.9 HYPOTHYROIDISM: Status: ACTIVE | Noted: 2023-03-24

## 2025-01-16 PROBLEM — E78.2 MIXED HYPERLIPIDEMIA: Status: ACTIVE | Noted: 2023-09-15

## 2025-01-16 PROBLEM — F41.9 ANXIETY: Status: ACTIVE | Noted: 2023-03-24

## 2025-01-16 PROBLEM — S32.009A CLOSED LUMBAR VERTEBRAL FRACTURE: Status: ACTIVE | Noted: 2025-01-16

## 2025-01-16 PROBLEM — S22.009A: Status: ACTIVE | Noted: 2024-07-02

## 2025-01-16 RX ORDER — MYCOPHENOLATE MOFETIL 500 MG/1
500 TABLET ORAL 2 TIMES DAILY
Qty: 60 TABLET | Refills: 4 | Status: SHIPPED | OUTPATIENT
Start: 2025-01-16

## 2025-01-16 RX ORDER — PILOCARPINE HYDROCHLORIDE 5 MG/1
5 TABLET, FILM COATED ORAL 2 TIMES DAILY
Qty: 60 TABLET | Refills: 4 | Status: SHIPPED | OUTPATIENT
Start: 2025-01-16

## 2025-01-16 RX ORDER — PREDNISONE 5 MG/1
5 TABLET ORAL DAILY
Qty: 30 TABLET | Refills: 4 | Status: SHIPPED | OUTPATIENT
Start: 2025-01-16

## 2025-01-16 RX ORDER — ALENDRONATE SODIUM 70 MG/1
70 TABLET ORAL
Qty: 12 TABLET | Refills: 1 | Status: SHIPPED | OUTPATIENT
Start: 2025-01-16

## 2025-01-16 RX ORDER — PREGABALIN 225 MG/1
225 CAPSULE ORAL 2 TIMES DAILY
Qty: 60 CAPSULE | Refills: 4 | Status: SHIPPED | OUTPATIENT
Start: 2025-01-16

## 2025-01-16 RX ORDER — TRAMADOL HYDROCHLORIDE 50 MG/1
50-100 TABLET ORAL EVERY 8 HOURS PRN
Qty: 180 TABLET | Refills: 4 | Status: SHIPPED | OUTPATIENT
Start: 2025-01-16

## 2025-01-16 NOTE — ASSESSMENT & PLAN NOTE
* DEXA  11/26/2024: Lumbar spine -1.7, left femoral neck -1.4, left total hip -0.3, major osteoporotic fracture risk 25%, hip fracture risk 2.6%

## 2025-04-27 DIAGNOSIS — Z51.81 ENCOUNTER FOR THERAPEUTIC DRUG MONITORING: Chronic | ICD-10-CM

## 2025-04-27 DIAGNOSIS — Z79.52 CURRENT USE OF STEROID MEDICATION: Chronic | ICD-10-CM

## 2025-04-27 DIAGNOSIS — M79.7 FIBROMYALGIA: Chronic | ICD-10-CM

## 2025-04-27 DIAGNOSIS — J84.89 NSIP (NONSPECIFIC INTERSTITIAL PNEUMONIA): Chronic | ICD-10-CM

## 2025-04-27 DIAGNOSIS — Z79.899 HIGH RISK MEDICATION USE: Chronic | ICD-10-CM

## 2025-04-27 DIAGNOSIS — D72.819 LEUKOPENIA, UNSPECIFIED TYPE: Chronic | ICD-10-CM

## 2025-04-27 DIAGNOSIS — M05.9 SEROPOSITIVE RHEUMATOID ARTHRITIS: Chronic | ICD-10-CM

## 2025-04-27 DIAGNOSIS — D84.821 IMMUNODEFICIENCY DUE TO DRUG THERAPY: Chronic | ICD-10-CM

## 2025-04-27 DIAGNOSIS — M35.01 SJOGREN'S SYNDROME WITH KERATOCONJUNCTIVITIS SICCA: Chronic | ICD-10-CM

## 2025-04-27 DIAGNOSIS — Z79.899 IMMUNODEFICIENCY DUE TO DRUG THERAPY: Chronic | ICD-10-CM

## 2025-04-27 DIAGNOSIS — M32.9 SYSTEMIC LUPUS ERYTHEMATOSUS, UNSPECIFIED SLE TYPE, UNSPECIFIED ORGAN INVOLVEMENT STATUS: Chronic | ICD-10-CM

## 2025-04-28 RX ORDER — PREGABALIN 225 MG/1
225 CAPSULE ORAL EVERY 12 HOURS SCHEDULED
Qty: 60 CAPSULE | Refills: 5 | Status: SHIPPED | OUTPATIENT
Start: 2025-04-28

## 2025-04-28 RX ORDER — PREGABALIN 225 MG/1
225 CAPSULE ORAL 2 TIMES DAILY
Qty: 60 CAPSULE | Refills: 5 | Status: SHIPPED | OUTPATIENT
Start: 2025-04-28

## 2025-04-30 RX ORDER — PILOCARPINE HYDROCHLORIDE 5 MG/1
5 TABLET, FILM COATED ORAL 2 TIMES DAILY
Qty: 180 TABLET | Refills: 1 | Status: SHIPPED | OUTPATIENT
Start: 2025-04-30

## 2025-06-30 ENCOUNTER — TELEPHONE (OUTPATIENT)
Age: 60
End: 2025-06-30
Payer: COMMERCIAL

## 2025-06-30 NOTE — TELEPHONE ENCOUNTER
Hub staff attempted to follow warm transfer process and was unsuccessful     Caller: Shell Nayak    Relationship to patient: Self    Best call back number: [phone]     Patient is needing: PT IS OUT OF MEDICATION AND NEEDS INFUSION AND NECT AVAIL IS SEPTEMBER, PLEASE CALL TO ADVISE PT ON WHAT THEY NEED TO DO TO GET MEDS AND INFUSION.

## 2025-07-08 DIAGNOSIS — Z79.52 CURRENT USE OF STEROID MEDICATION: Chronic | ICD-10-CM

## 2025-07-08 DIAGNOSIS — J84.89 NSIP (NONSPECIFIC INTERSTITIAL PNEUMONIA): Chronic | ICD-10-CM

## 2025-07-08 DIAGNOSIS — Z51.81 ENCOUNTER FOR THERAPEUTIC DRUG MONITORING: Chronic | ICD-10-CM

## 2025-07-08 DIAGNOSIS — M35.01 SJOGREN'S SYNDROME WITH KERATOCONJUNCTIVITIS SICCA: Chronic | ICD-10-CM

## 2025-07-08 DIAGNOSIS — M05.9 SEROPOSITIVE RHEUMATOID ARTHRITIS: Chronic | ICD-10-CM

## 2025-07-08 DIAGNOSIS — D72.819 LEUKOPENIA, UNSPECIFIED TYPE: Chronic | ICD-10-CM

## 2025-07-08 DIAGNOSIS — Z79.899 IMMUNODEFICIENCY DUE TO DRUG THERAPY: Chronic | ICD-10-CM

## 2025-07-08 DIAGNOSIS — Z79.899 HIGH RISK MEDICATION USE: Chronic | ICD-10-CM

## 2025-07-08 DIAGNOSIS — M79.7 FIBROMYALGIA: Chronic | ICD-10-CM

## 2025-07-08 DIAGNOSIS — M32.9 SYSTEMIC LUPUS ERYTHEMATOSUS, UNSPECIFIED SLE TYPE, UNSPECIFIED ORGAN INVOLVEMENT STATUS: Chronic | ICD-10-CM

## 2025-07-08 DIAGNOSIS — D84.821 IMMUNODEFICIENCY DUE TO DRUG THERAPY: Chronic | ICD-10-CM

## 2025-07-08 DIAGNOSIS — M32.9 SYSTEMIC LUPUS ERYTHEMATOSUS, UNSPECIFIED SLE TYPE, UNSPECIFIED ORGAN INVOLVEMENT STATUS: Primary | ICD-10-CM

## 2025-07-09 ENCOUNTER — TELEPHONE (OUTPATIENT)
Age: 60
End: 2025-07-09
Payer: COMMERCIAL

## 2025-07-09 RX ORDER — PREGABALIN 225 MG/1
225 CAPSULE ORAL 2 TIMES DAILY
Qty: 60 CAPSULE | Refills: 5 | Status: SHIPPED | OUTPATIENT
Start: 2025-07-09

## 2025-07-09 RX ORDER — ALENDRONATE SODIUM 70 MG/1
70 TABLET ORAL
Qty: 12 TABLET | Refills: 1 | Status: SHIPPED | OUTPATIENT
Start: 2025-07-09 | End: 2025-07-09

## 2025-07-09 RX ORDER — MYCOPHENOLATE MOFETIL 500 MG/1
500 TABLET ORAL 2 TIMES DAILY
Qty: 60 TABLET | Refills: 4 | Status: SHIPPED | OUTPATIENT
Start: 2025-07-09

## 2025-07-09 RX ORDER — TRAMADOL HYDROCHLORIDE 50 MG/1
50-100 TABLET ORAL EVERY 8 HOURS PRN
Qty: 180 TABLET | Refills: 4 | Status: SHIPPED | OUTPATIENT
Start: 2025-07-09

## 2025-07-09 RX ORDER — ALENDRONATE SODIUM 70 MG/1
70 TABLET ORAL
Qty: 4 TABLET | Refills: 0 | Status: SHIPPED | OUTPATIENT
Start: 2025-07-09

## 2025-07-09 NOTE — TELEPHONE ENCOUNTER
Lab orders faxed to [phone] [facility]. Patient notified   Clindamycin Counseling: I counseled the patient regarding use of clindamycin as an antibiotic for prophylactic and/or therapeutic purposes. Clindamycin is active against numerous classes of bacteria, including skin bacteria. Side effects may include nausea, diarrhea, gastrointestinal upset, rash, hives, yeast infections, and in rare cases, colitis.

## 2025-07-09 NOTE — TELEPHONE ENCOUNTER
PT NEEDS LABS FAXED TO:    [facility] [place]  FAX: [phone]    PT CURRENTLY AT LAB.    -

## 2025-07-09 NOTE — TELEPHONE ENCOUNTER
I will send her in a months worth of medication, however we need updated labs. I will place an order for labs in the chart.

## (undated) DEVICE — BOWL UTIL STRL 32OZ

## (undated) DEVICE — Device: Brand: BALLOON

## (undated) DEVICE — SINGLE USE BIOPSY VALVE MAJ-210: Brand: SINGLE USE BIOPSY VALVE (STERILE)

## (undated) DEVICE — TRAP,MUCUS SPECIMEN,40CC: Brand: MEDLINE

## (undated) DEVICE — ADAPT SWVL FIBROPTIC BRONCH

## (undated) DEVICE — SINGLE USE SUCTION VALVE MAJ-209: Brand: SINGLE USE SUCTION VALVE (STERILE)

## (undated) DEVICE — LUER-LOK 360°: Brand: CONNECTA, LUER-LOK

## (undated) DEVICE — ST EXT MICROBORE FIX M LL 38IN

## (undated) DEVICE — Device: Brand: SINGLE USE ASPIRATION NEEDLE NA-U401SX

## (undated) DEVICE — FRCP BX RADJAW4 PULM WO NDL STD1.8X2 100